# Patient Record
Sex: MALE | Race: OTHER | Employment: FULL TIME | URBAN - METROPOLITAN AREA
[De-identification: names, ages, dates, MRNs, and addresses within clinical notes are randomized per-mention and may not be internally consistent; named-entity substitution may affect disease eponyms.]

---

## 2017-05-26 PROBLEM — K21.9 GASTROESOPHAGEAL REFLUX DISEASE WITHOUT ESOPHAGITIS: Status: ACTIVE | Noted: 2017-05-26

## 2017-06-15 ENCOUNTER — ANESTHESIA EVENT (OUTPATIENT)
Dept: ENDOSCOPY | Age: 37
End: 2017-06-15
Payer: COMMERCIAL

## 2017-06-16 ENCOUNTER — HOSPITAL ENCOUNTER (OUTPATIENT)
Age: 37
Setting detail: OUTPATIENT SURGERY
Discharge: HOME | End: 2017-06-16
Attending: INTERNAL MEDICINE | Admitting: INTERNAL MEDICINE
Payer: COMMERCIAL

## 2017-06-16 ENCOUNTER — ANESTHESIA (OUTPATIENT)
Dept: ENDOSCOPY | Age: 37
End: 2017-06-16
Payer: COMMERCIAL

## 2017-06-16 VITALS
TEMPERATURE: 96 F | SYSTOLIC BLOOD PRESSURE: 115 MMHG | RESPIRATION RATE: 12 BRPM | DIASTOLIC BLOOD PRESSURE: 73 MMHG | HEART RATE: 68 BPM | OXYGEN SATURATION: 100 %

## 2017-06-16 PROCEDURE — 74011250636 HC RX REV CODE- 250/636

## 2017-06-16 PROCEDURE — 88342 IMHCHEM/IMCYTCHM 1ST ANTB: CPT | Performed by: INTERNAL MEDICINE

## 2017-06-16 PROCEDURE — 88305 TISSUE EXAM BY PATHOLOGIST: CPT | Performed by: INTERNAL MEDICINE

## 2017-06-16 PROCEDURE — 74011000250 HC RX REV CODE- 250

## 2017-06-16 PROCEDURE — 77030009426 HC FCPS BIOP ENDOSC BSC -B: Performed by: INTERNAL MEDICINE

## 2017-06-16 PROCEDURE — 74011250636 HC RX REV CODE- 250/636: Performed by: NURSE ANESTHETIST, CERTIFIED REGISTERED

## 2017-06-16 PROCEDURE — 76060000031 HC ANESTHESIA FIRST 0.5 HR: Performed by: INTERNAL MEDICINE

## 2017-06-16 PROCEDURE — 76040000019: Performed by: INTERNAL MEDICINE

## 2017-06-16 RX ORDER — LIDOCAINE HYDROCHLORIDE 10 MG/ML
0.1 INJECTION, SOLUTION EPIDURAL; INFILTRATION; INTRACAUDAL; PERINEURAL AS NEEDED
Status: DISCONTINUED | OUTPATIENT
Start: 2017-06-16 | End: 2017-06-16 | Stop reason: HOSPADM

## 2017-06-16 RX ORDER — SODIUM CHLORIDE, SODIUM LACTATE, POTASSIUM CHLORIDE, CALCIUM CHLORIDE 600; 310; 30; 20 MG/100ML; MG/100ML; MG/100ML; MG/100ML
50 INJECTION, SOLUTION INTRAVENOUS CONTINUOUS
Status: DISCONTINUED | OUTPATIENT
Start: 2017-06-17 | End: 2017-06-16 | Stop reason: HOSPADM

## 2017-06-16 RX ORDER — PROPOFOL 10 MG/ML
INJECTION, EMULSION INTRAVENOUS AS NEEDED
Status: DISCONTINUED | OUTPATIENT
Start: 2017-06-16 | End: 2017-06-16 | Stop reason: HOSPADM

## 2017-06-16 RX ORDER — SODIUM CHLORIDE 0.9 % (FLUSH) 0.9 %
5-10 SYRINGE (ML) INJECTION EVERY 8 HOURS
Status: DISCONTINUED | OUTPATIENT
Start: 2017-06-16 | End: 2017-06-16 | Stop reason: HOSPADM

## 2017-06-16 RX ORDER — SODIUM CHLORIDE 0.9 % (FLUSH) 0.9 %
5-10 SYRINGE (ML) INJECTION AS NEEDED
Status: DISCONTINUED | OUTPATIENT
Start: 2017-06-16 | End: 2017-06-16 | Stop reason: HOSPADM

## 2017-06-16 RX ORDER — LIDOCAINE HYDROCHLORIDE 20 MG/ML
INJECTION, SOLUTION EPIDURAL; INFILTRATION; INTRACAUDAL; PERINEURAL AS NEEDED
Status: DISCONTINUED | OUTPATIENT
Start: 2017-06-16 | End: 2017-06-16 | Stop reason: HOSPADM

## 2017-06-16 RX ORDER — DEXTROMETHORPHAN/PSEUDOEPHED 2.5-7.5/.8
1.2 DROPS ORAL
Status: DISCONTINUED | OUTPATIENT
Start: 2017-06-16 | End: 2017-06-16 | Stop reason: HOSPADM

## 2017-06-16 RX ORDER — PROPOFOL 10 MG/ML
INJECTION, EMULSION INTRAVENOUS
Status: DISCONTINUED | OUTPATIENT
Start: 2017-06-16 | End: 2017-06-16 | Stop reason: HOSPADM

## 2017-06-16 RX ORDER — FAMOTIDINE 20 MG/1
20 TABLET, FILM COATED ORAL ONCE
Status: DISCONTINUED | OUTPATIENT
Start: 2017-06-17 | End: 2017-06-16 | Stop reason: HOSPADM

## 2017-06-16 RX ADMIN — PROPOFOL 160 MCG/KG/MIN: 10 INJECTION, EMULSION INTRAVENOUS at 11:00

## 2017-06-16 RX ADMIN — PROPOFOL 100 MG: 10 INJECTION, EMULSION INTRAVENOUS at 11:08

## 2017-06-16 RX ADMIN — LIDOCAINE HYDROCHLORIDE 40 MG: 20 INJECTION, SOLUTION EPIDURAL; INFILTRATION; INTRACAUDAL; PERINEURAL at 11:08

## 2017-06-16 RX ADMIN — SODIUM CHLORIDE, SODIUM LACTATE, POTASSIUM CHLORIDE, AND CALCIUM CHLORIDE 50 ML/HR: 600; 310; 30; 20 INJECTION, SOLUTION INTRAVENOUS at 10:59

## 2017-06-16 NOTE — H&P
History and Physical    Cecil Prasad        1980  [id number]        [id number]     Pre-Procedure Diagnosis:  epigastric pain  r10.3    Chief Complaint:  No chief complaint on file. HPI: Patient with epigastric pain. Nausea but no vomiting. No other abdominal pain. No weight loss. No fevers or chills. No improvement with treatment. Past Medical History:   Diagnosis Date    Hypocholesterolemia     WPW (Scott-Parkinson-White syndrome)      Past Surgical History:   Procedure Laterality Date    HX CERVICAL DISKECTOMY      HX HEART VALVE SURGERY      HX WISDOM TEETH EXTRACTION       Family History   Problem Relation Age of Onset    No Known Problems Mother     Heart Disease Father     Hypertension Father     No Known Problems Sister     No Known Problems Brother      Social History     Social History    Marital status:      Spouse name: N/A    Number of children: N/A    Years of education: N/A     Social History Main Topics    Smoking status: Never Smoker    Smokeless tobacco: Never Used    Alcohol use Yes    Drug use: Not on file    Sexual activity: Not on file     Other Topics Concern    Not on file     Social History Narrative    No narrative on file       Allergies:  No Known Allergies  Medications:   No current facility-administered medications for this encounter. Vital Signs There were no vitals taken for this visit. Review of Systems  A comprehensive review of systems was negative except for that written in the History of Present Illness. Physical Exam:  General:  Alert, cooperative, no distress, appears stated age. Eyes:  Conjunctivae/corneas clear. PERRL, EOMs intact. Fundi benign           Mouth/Throat: Lips, mucosa, and tongue normal. Teeth and gums normal.   Neck: Supple, symmetrical, trachea midline, no adenopathy, thyroid: no enlargement/tenderness/nodules, no carotid bruit and no JVD. Lungs:   Clear to auscultation bilaterally.    Heart: Regular rate and rhythm, S1, S2 normal, no murmur, click, rub or gallop. Abdomen:   Soft, non-tender. Bowel sounds normal. No masses,  No organomegaly. Extremities: Extremities normal, atraumatic, no cyanosis or edema. Skin: Skin color, texture, turgor normal. No rashes or lesions             Laboratory Data:  No results found for this or any previous visit (from the past 24 hour(s)). Hospital Problems  Date Reviewed: 9/9/2016    None          Impression and Plan:  Epigastric pain.   Recommend EGD see prior H&P      Demetria Cobian MD  6/16/2017  10:37 AM

## 2017-06-16 NOTE — IP AVS SNAPSHOT
[address] [address] Toya Mccarty  
[phone] Patient: Tim Holland MRN: [id number] :1980 You are allergic to the following No active allergies Recent Documentation Smoking Status Never Smoker Emergency Contacts Name Discharge Info Relation Home Work Mobile Kimberlee Kinney DISCHARGE CAREGIVER [3] Spouse [3] [phone] About your hospitalization You were admitted on:  2017 You last received care in the:  [facility] PHASE 2 RECOVERY You were discharged on:  2017 Unit phone number:  [phone] Why you were hospitalized Your primary diagnosis was:  Not on File Providers Seen During Your Hospitalizations Provider Role Specialty Primary office phone Mike Sheth MD Attending Provider Gastroenterology [phone] Your Primary Care Physician (PCP) Primary Care Physician Office Phone Office Fax Hyland Curling [phone] Follow-up Information Follow up With Details Comments Contact Info Cassie Patrick MD   [address] [place] [address] [phone] Current Discharge Medication List  
  
CONTINUE these medications which have NOT CHANGED Dose & Instructions Dispensing Information Comments Morning Noon Evening Bedtime  
 multivitamin tablet Commonly known as:  ONE A DAY Your last dose was: Your next dose is:    
   
   
 Dose:  1 Tab Take 1 Tab by mouth daily. Refills:  0 Discharge Instructions Patient Discharge Instructions Tim Holland / [id number] : 1980 Admitted 2017 Discharged: 2017 Procedure Impression: 1. Esophagitis. Biopsies taken to evaluate for EoE and infectious sources. 2. Otherwise normal EGD. Biopsies taken to rule out H. Pylori and celiac disease. Recommendation: 1. Resume regular diet. 2. Will contact with biopsy results in 2 weeks 3. Follow up in office in 4 weeks. Recommended Diet: Regular Diet Recommended Activity: 1. Do not drink alcohol, drive or operate machinery for 12 hours 2. Call if any fever, abdominal pain or bleeding noted. Signed By: Ryder Ochoa MD   
 June 16, 2017 DISCHARGE SUMMARY from Nurse The following personal items are in your possession at time of discharge: 
 
Dental Appliances: None Visual Aid: None PATIENT INSTRUCTIONS: 
 
After general anesthesia or intravenous sedation, for 24 hours or while taking prescription Narcotics: · Limit your activities · Do not drive and operate hazardous machinery · Do not make important personal or business decisions · Do  not drink alcoholic beverages · If you have not urinated within 8 hours after discharge, please contact your surgeon on call. Report the following to your surgeon: 
· Excessive pain, swelling, redness or odor of or around the surgical area · Temperature over 100.5 · Nausea and vomiting lasting longer than 4 hours or if unable to take medications · Any signs of decreased circulation or nerve impairment to extremity: change in color, persistent  numbness, tingling, coldness or increase pain · Any questions What to do at Home: These are general instructions for a healthy lifestyle: No smoking/ No tobacco products/ Avoid exposure to second hand smoke Surgeon General's Warning:  Quitting smoking now greatly reduces serious risk to your health. Obesity, smoking, and sedentary lifestyle greatly increases your risk for illness A healthy diet, regular physical exercise & weight monitoring are important for maintaining a healthy lifestyle You may be retaining fluid if you have a history of heart failure or if you experience any of the following symptoms:  Weight gain of 3 pounds or more overnight or 5 pounds in a week, increased swelling in our hands or feet or shortness of breath while lying flat in bed. Please call your doctor as soon as you notice any of these symptoms; do not wait until your next office visit. Recognize signs and symptoms of STROKE: 
 
F-face looks uneven A-arms unable to move or move unevenly S-speech slurred or non-existent T-time-call 911 as soon as signs and symptoms begin-DO NOT go Back to bed or wait to see if you get better-TIME IS BRAIN. Warning Signs of HEART ATTACK Call 911 if you have these symptoms: 
? Chest discomfort. Most heart attacks involve discomfort in the center of the chest that lasts more than a few minutes, or that goes away and comes back. It can feel like uncomfortable pressure, squeezing, fullness, or pain. ? Discomfort in other areas of the upper body. Symptoms can include pain or discomfort in one or both arms, the back, neck, jaw, or stomach. ? Shortness of breath with or without chest discomfort. ? Other signs may include breaking out in a cold sweat, nausea, or lightheadedness. Don't wait more than five minutes to call [address]! Fast action can save your life. Calling 911 is almost always the fastest way to get lifesaving treatment. Emergency Medical Services staff can begin treatment when they arrive  up to an hour sooner than if someone gets to the hospital by car. The discharge information has been reviewed with the patient. The patient verbalized understanding. Discharge medications reviewed with the patient and appropriate educational materials and side effects teaching were provided. Patient armband removed and given to patient to take home. Patient was informed of the privacy risks if armband lost or stolen Discharge Orders None Introducing [facility]! [employer] introduces [email or web address] patient portal. Now you can access parts of your medical record, email your doctor's office, and request medication refills online. 1. In your internet browser, go to [email or web address] 2. Click on the First Time User? Click Here link in the Sign In box. You will see the New Member Sign Up page. 3. Enter your [email or web address] Access Code exactly as it appears below. You will not need to use this code after youve completed the sign-up process. If you do not sign up before the expiration date, you must request a new code. · [email or web address] Access Code: [email or web address] Expires: 9/13/2017  7:35 AM 
 
4. Enter the last four digits of your Social Security Number (xxxx) and Date of Birth (mm/dd/yyyy) as indicated and click Submit. You will be taken to the next sign-up page. 5. Create a [email or web address] ID. This will be your [email or web address] login ID and cannot be changed, so think of one that is secure and easy to remember. 6. Create a [email or web address] password. You can change your password at any time. 7. Enter your Password Reset Question and Answer. This can be used at a later time if you forget your password. 8. Enter your e-mail address. You will receive e-mail notification when new information is available in [address] 9. Click Sign Up. You can now view and download portions of your medical record. 10. Click the Download Summary menu link to download a portable copy of your medical information. If you have questions, please visit the Frequently Asked Questions section of the [email or web address] website. Remember, [email or web address] is NOT to be used for urgent needs. For medical emergencies, dial 911. Now available from your iPhone and Android! General Information Please provide this summary of care documentation to your next provider.  
  
  
    
    
 Patient Signature: ____________________________________________________________ Date:  ____________________________________________________________  
  
Leonel Breath Provider Signature:  ____________________________________________________________ Date:  ____________________________________________________________

## 2017-06-16 NOTE — ANESTHESIA PREPROCEDURE EVALUATION
Anesthetic History   No history of anesthetic complications            Review of Systems / Medical History  Patient summary reviewed and pertinent labs reviewed    Pulmonary  Within defined limits                 Neuro/Psych   Within defined limits           Cardiovascular            Dysrhythmias       Exercise tolerance: >4 METS     GI/Hepatic/Renal  Within defined limits              Endo/Other  Within defined limits           Other Findings   Comments:   Risk Factors for Postoperative nausea/vomiting:       History of postoperative nausea/vomiting? NO       Female? NO       Motion sickness? NO       Intended opioid administration for postoperative analgesia? NO      Smoking Abstinence  Current Smoker? NO  Elective Surgery? YES  Seen preoperatively by anesthesiologist or proxy prior to day of surgery? YES  Pt abstained from smoking 24 hours prior to anesthesia?  N/A           Physical Exam    Airway  Mallampati: II  TM Distance: 4 - 6 cm  Neck ROM: normal range of motion   Mouth opening: Normal     Cardiovascular    Rhythm: regular  Rate: normal         Dental  No notable dental hx       Pulmonary  Breath sounds clear to auscultation               Abdominal  GI exam deferred       Other Findings            Anesthetic Plan    ASA: 2  Anesthesia type: MAC            Anesthetic plan and risks discussed with: Patient

## 2017-06-16 NOTE — PROCEDURES
Endoscopy Procedure Note    Patient: Rafy Valencia MRN: [id number]  SSN: [id number]    YOB: 1980  Age: 44 y.o. Sex: male      Date/Time:  6/16/2017 11:19 AM    Esophagogastroduodenoscopy (EGD) Procedure Note    Procedure: Esophagogastroduodenoscopy with biopsy    IMPRESSION:   1. Esophagitis. Biopsies taken to evaluate for EoE and infectious sources. 2. Otherwise normal EGD. Biopsies taken to rule out H. Pylori and celiac disease. RECOMMENDATIONS:  1. Resume regular diet. 2. Will contact with biopsy results in 2 weeks. 3. Follow up in office in 4 weeks. Indication: Abdominal pain, epigastric  :  Jinny Harmon MD  Assistants: Endoscopy Technician-1: Nestor Dupont RN-1: Mukul Carlos RN    Referring Provider:   Bridget Rosado MD  History: The history and physical exam were reviewed and updated. Endoscope: Olympus GIF-190 diagnostic endoscope  Extent of Exam: third portion of the duodenum  ASA: ASA 3 - Patient with moderate systemic disease with functional limitations  Anethesia/Sedation:  MAC anesthesia    Description of the procedure: The procedure was discussed with the patient including risks, benefits, alternatives including risks of iv sedation, bleeding, perforation and aspiration. A safety timeout was performed. The patient was placed in the left lateral decubitus position. A bite block was placed. The patient was given incremental doses of intravenous sedation until moderate sedation was achieved. The patients vital signs were monitored at all times including heart rate/rhythm, blood pressure and oxygen saturation. The endoscope was then passed under direct visualization to the third portion of the duodenum. The endoscope was then slowly withdrawn while visualizing the mucosa. In the stomach a retroflexion was performed and gastric fundus and cardia visualized. The endoscope was then slowly withdrawn.   The patient was then transferred to recovery in stable condition. Findings:    Esophagus: There was edema and healing appearance to the mucosa with linear furrows. No stricture noted. No ulceration, mass. No evidence of Barretts. Biopsies taken. Stomach: The gastric mucosa was normal with no ulceration, mass, stricture. Four pinch biopsies taken. Duodenum: The duodenum mucosa was normal with no ulceration, mass, stricture and no evidence of villous atrophy. Four pinch biopsies taken for celiac disease evaluation. Therapies:  None    Specimens:   ID Type Source Tests Collected by Time Destination   1 : bx r/o celiac  Preservative Duodenum  Adria Walter MD 6/16/2017 1116 Pathology   2 : bx r/o H. Pylori  Preservative Gastric  Adria Walter MD 6/16/2017 1116 Pathology   3 : bx r/o esophagitis  Preservative Esophagus, Distal  Adria Walter MD 6/16/2017 1116 Pathology               Complications:   None; patient tolerated the procedure well.     EBL:Minimal    Discharge disposition:  Home in the company of  when able to ambulate    Adria Walter MD  June 16, 2017  11:19 AM

## 2017-06-16 NOTE — ANESTHESIA POSTPROCEDURE EVALUATION
Post-Anesthesia Evaluation and Assessment    Patient: Tim Holland MRN: [id number]  SSN: [id number]    YOB: 1980  Age: 44 y.o. Sex: male       Cardiovascular Function/Vital Signs  Visit Vitals    /73    Pulse 68    Temp 35.6 °C (96 °F)    Resp 12    SpO2 100%       Patient is status post MAC anesthesia for Procedure(s):  ESOPHAGOGASTRODUODENOSCOPY (EGD). Nausea/Vomiting: None    Postoperative hydration reviewed and adequate. Pain:  Pain Scale 1: Numeric (0 - 10) (06/16/17 1126)  Pain Intensity 1: 0 (06/16/17 1126)   Managed    Neurological Status: At baseline    Mental Status and Level of Consciousness: Alert and oriented     Pulmonary Status:   O2 Device: Room air (06/16/17 1127)   Adequate oxygenation and airway patent    Complications related to anesthesia: None    Post-anesthesia assessment completed.  No concerns    Signed By: Yaakov Duffy CRNA     June 16, 2017

## 2017-08-02 ENCOUNTER — OFFICE VISIT (OUTPATIENT)
Dept: FAMILY MEDICINE CLINIC | Age: 37
End: 2017-08-02

## 2017-08-02 VITALS
HEIGHT: 74 IN | SYSTOLIC BLOOD PRESSURE: 133 MMHG | DIASTOLIC BLOOD PRESSURE: 79 MMHG | WEIGHT: 199 LBS | RESPIRATION RATE: 20 BRPM | TEMPERATURE: 98.7 F | BODY MASS INDEX: 25.54 KG/M2 | OXYGEN SATURATION: 98 % | HEART RATE: 85 BPM

## 2017-08-02 DIAGNOSIS — Z23 ENCOUNTER FOR IMMUNIZATION: ICD-10-CM

## 2017-08-02 DIAGNOSIS — Z00.00 ROUTINE PHYSICAL EXAMINATION: Primary | ICD-10-CM

## 2017-08-02 RX ORDER — OMEPRAZOLE 40 MG/1
40 CAPSULE, DELAYED RELEASE ORAL DAILY
COMMUNITY
End: 2020-01-06

## 2017-08-02 NOTE — MR AVS SNAPSHOT
Visit Information Date & Time Provider Department Dept. Phone Encounter #  
 8/2/2017  2:00 PM April GriceInga [facility] [phone] [id number] Follow-up Instructions Return in about 1 year (around 8/2/2018), or as needed, for CPE. Upcoming Health Maintenance Date Due DTaP/Tdap/Td series (1 - Tdap) 1/9/2000 Allergies as of 8/2/2017  Review Complete On: 8/2/2017 By: Kamille Tristan MD  
 No Known Allergies Current Immunizations  Never Reviewed Name Date Tdap  Incomplete Not reviewed this visit You Were Diagnosed With   
  
 Codes Comments Routine physical examination    -  Primary ICD-10-CM: Z00.00 ICD-9-CM: V70.0 Encounter for immunization     ICD-10-CM: T61 ICD-9-CM: V03.89 Vitals BP Pulse Temp Resp Height(growth percentile) Weight(growth percentile) 133/79 85 98.7 °F (37.1 °C) (Oral) 20 6' 2\" (1.88 m) 199 lb (90.3 kg) SpO2 BMI Smoking Status 98% 25.55 kg/m2 Never Smoker BMI and BSA Data Body Mass Index Body Surface Area 25.55 kg/m 2 2.17 m 2 Preferred Pharmacy Pharmacy Name Phone [facility] [place] [address] [phone] Your Updated Medication List  
  
   
This list is accurate as of: 8/2/17  2:21 PM.  Always use your most recent med list.  
  
  
  
  
 multivitamin tablet Commonly known as:  ONE A DAY Take 1 Tab by mouth daily. PriLOSEC 40 mg capsule Generic drug:  omeprazole Take 40 mg by mouth daily. We Performed the Following TETANUS, DIPHTHERIA TOXOIDS AND ACELLULAR PERTUSSIS VACCINE (TDAP), IN INDIVIDS. >=7, IM [id number] CPT(R)] Follow-up Instructions Return in about 1 year (around 8/2/2018), or as needed, for CPE. To-Do List   
 08/02/2017 Lab:  CBC WITH AUTOMATED DIFF   
  
 08/02/2017 Lab:  HEMOGLOBIN A1C WITH EAG   
  
 08/02/2017   Lab:  LIPID PANEL   
  
 2017 Lab:  METABOLIC PANEL, COMPREHENSIVE Patient Instructions Vaccine Information Statement Tdap (Tetanus, Diphtheria, Pertussis) Vaccine: What You Need to Know Many Vaccine Information Statements are available in [place] and other languages. See www.immunize.org/vis. Hojas de Información Sobre Vacunas están disponibles en español y en muchos otros idiomas. Visite WorthScale.si 1. Why get vaccinated? Tetanus, diphtheria, and pertussis are very serious diseases. Tdap vaccine can protect us from these diseases. And, Tdap vaccine given to pregnant women can protect  babies against pertussis. TETANUS (Lockjaw) is rare in the [facility] today. It causes painful muscle tightening and stiffness, usually all over the body. ? It can lead to tightening of muscles in the head and neck so you cant open your mouth, swallow, or sometimes even breathe. Tetanus kills about 1 out of 10 people who are infected even after receiving the best medical care. DIPHTHERIA is also rare in the [facility] today. It can cause a thick coating to form in the back of the throat. ? It can lead to breathing problems, heart failure, paralysis, and death. PERTUSSIS (Whooping Cough) causes severe coughing spells, which can cause difficulty breathing, vomiting, and disturbed sleep. ? It can also lead to weight loss, incontinence, and rib fractures. Up to 2 in 100 adolescents and 5 in 100 adults with pertussis are hospitalized or have complications, which could include pneumonia or death. These diseases are caused by bacteria. Diphtheria and pertussis are spread from person to person through secretions from coughing or sneezing. Tetanus enters the body through cuts, scratches, or wounds.  
 
Before vaccines, as many as 200,000 cases of diphtheria, 200,000 cases of pertussis, and hundreds of cases of tetanus, were reported in the [facility] each year. Since vaccination began, reports of cases for tetanus and diphtheria have dropped by about 99% and for pertussis by about 80%. 2. Tdap vaccine Tdap vaccine can protect adolescents and adults from tetanus, diphtheria, and pertussis. One dose of Tdap is routinely given at age 6 or 15. People who did not get Tdap at that age should get it as soon as possible. Tdap is especially important for health care professionals and anyone having close contact with a baby younger than 12 months. Pregnant women should get a dose of Tdap during every pregnancy, to protect the  from pertussis. Infants are most at risk for severe, life-threatening complications from pertussis. Another vaccine, called Td, protects against tetanus and diphtheria, but not pertussis. A Td booster should be given every 10 years. Tdap may be given as one of these boosters if you have never gotten Tdap before. Tdap may also be given after a severe cut or burn to prevent tetanus infection. Your doctor or the person giving you the vaccine can give you more information. Tdap may safely be given at the same time as other vaccines. 3. Some people should not get this vaccine  A person who has ever had a life-threatening allergic reaction after a previous dose of any diphtheria, tetanus or pertussis containing vaccine, OR has a severe allergy to any part of this vaccine, should not get Tdap vaccine. Tell the person giving the vaccine about any severe allergies.  Anyone who had coma or long repeated seizures within 7 days after a childhood dose of DTP or DTaP, or a previous dose of Tdap, should not get Tdap, unless a cause other than the vaccine was found. They can still get Td.  
 
 Talk to your doctor if you: 
- have seizures or another nervous system problem, 
- had severe pain or swelling after any vaccine containing diphtheria, tetanus or pertussis,  
 - ever had a condition called Guillain Barré Syndrome (GBS), 
- arent feeling well on the day the shot is scheduled. 4. Risks With any medicine, including vaccines, there is a chance of side effects. These are usually mild and go away on their own. Serious reactions are also possible but are rare. Most people who get Tdap vaccine do not have any problems with it. Mild Problems following Tdap 
(Did not interfere with activities)  Pain where the shot was given (about 3 in 4 adolescents or 2 in 3 adults)  Redness or swelling where the shot was given (about 1 person in 5)  Mild fever of at least 100.4°F (up to about 1 in 25 adolescents or 1 in 100 adults)  Headache (about 3 or 4 people in 10)  Tiredness (about 1 person in 3 or 4)  Nausea, vomiting, diarrhea, stomach ache (up to 1 in 4 adolescents or 1 in 10 adults)  Chills,  sore joints (about 1 person in 10)  Body aches (about 1 person in 3 or 4)  Rash, swollen glands (uncommon) Moderate Problems following Tdap (Interfered with activities, but did not require medical attention)  Pain where the shot was given (up to 1 in 5 or 6)  Redness or swelling where the shot was given (up to about 1 in 16 adolescents or 1 in 12 adults)  Fever over 102°F (about 1 in 100 adolescents or 1 in 250 adults)  Headache (about 1 in 7 adolescents or 1 in 10 adults)  Nausea, vomiting, diarrhea, stomach ache (up to 1 or 3 people in 100)  Swelling of the entire arm where the shot was given (up to about 1 in 500). Severe Problems following Tdap 
(Unable to perform usual activities; required medical attention)  Swelling, severe pain, bleeding, and redness in the arm where the shot was given (rare). Problems that could happen after any vaccine:  People sometimes faint after a medical procedure, including vaccination.  Sitting or lying down for about 15 minutes can help prevent fainting, and injuries caused by a fall. Tell your doctor if you feel dizzy, or have vision changes or ringing in the ears.  Some people get severe pain in the shoulder and have difficulty moving the arm where a shot was given. This happens very rarely.  Any medication can cause a severe allergic reaction. Such reactions from a vaccine are very rare, estimated at fewer than 1 in a million doses, and would happen within a few minutes to a few hours after the vaccination. As with any medicine, there is a very remote chance of a vaccine causing a serious injury or death. The safety of vaccines is always being monitored. For more information, visit: www.cdc.gov/vaccinesafety/ 
 
 
The [facility] Vaccine Injury Compensation Program (VICP) is a federal program that was created to compensate people who may have been injured by certain vaccines.  
 
Persons who believe they may have been injured by a vaccine can learn about the program and about filing a claim by calling 0-452.971.2498 or visiting the 1900  website at www.[facility].gov/vaccinecompensation. There is a time limit to file a claim for compensation. 7. How can I learn more?  Ask your doctor. He or she can give you the vaccine package insert or suggest other sources of information.  Call your local or state health department.  Contact the Centers for Disease Control and Prevention (CDC): 
- Call 6-177.879.2291 (1-264-ATP-INFO) or 
- Visit CDCs website at www.cdc.gov/vaccines Vaccine Information Statement Tdap Vaccine 
(2/24/2015) 42 DESTINEE You Paci 428XZ-41 Department of Health and [employer] Centers for Disease Control and Prevention Office Use Only [facility]! Dear Leigh Ann Vasquez: 
Thank you for requesting a Fonality account. Our records indicate that you already have an active Fonality account. You can access your account anytime at [email or web address] Did you know that you can access your hospital and ER discharge instructions at any time in Fonality? You can also review all of your test results from your hospital stay or ER visit. Additional Information If you have questions, please visit the Frequently Asked Questions section of the Fonality website at [email or web address] Remember, Fonality is NOT to be used for urgent needs. For medical emergencies, dial 911. Now available from your iPhone and Android! Please provide this summary of care documentation to your next provider. Your primary care clinician is listed as Gloria Malone. If you have any questions after today's visit, please call [phone].

## 2017-08-02 NOTE — PROGRESS NOTES
ANNUAL PHYSICAL EXAMINATION    History of Present Illness  Fuad Trivedi is a 44 y.o. male who presents today for management of    Chief Complaint   Patient presents with    Physical       Patient was started on omeprazole by GI for GERD. Patient rides his bike about 10-60 miles per week. He generally follows a healthy diet. He is sexually active with his wife. Past Medical History  Past Medical History:   Diagnosis Date    Hypocholesterolemia     WPW (Scott-Parkinson-White syndrome)         Surgical History  Past Surgical History:   Procedure Laterality Date    HX CERVICAL DISKECTOMY      HX HEART VALVE SURGERY      HX WISDOM TEETH EXTRACTION          Current Medications  Current Outpatient Prescriptions   Medication Sig    omeprazole (PRILOSEC) 40 mg capsule Take 40 mg by mouth daily.  multivitamin (ONE A DAY) tablet Take 1 Tab by mouth daily. No current facility-administered medications for this visit. Allergies/Drug Reactions  No Known Allergies     Family History  Family History   Problem Relation Age of Onset    No Known Problems Mother     Heart Disease Father     Hypertension Father     No Known Problems Sister     No Known Problems Brother         Social History  Social History     Social History    Marital status:      Spouse name: N/A    Number of children: N/A    Years of education: N/A     Occupational History    Not on file.      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     Review of Systems  General ROS: negative for - chills, fatigue, fever, sleep disturbance, weight gain or weight loss  Psychological ROS: negative for - anxiety, concentration difficulties, depression or mood swings  Ophthalmic ROS: positive for - uses glasses  negative for - blurry vision, decreased vision, double vision, dry eyes, excessive tearing or eye pain  ENT ROS: negative  Allergy and Immunology ROS: negative for - nasal congestion or seasonal allergies  Hematological and Lymphatic ROS: negative for - bleeding problems, blood clots or blood transfusions  Endocrine ROS: negative for - hot flashes, palpitations or temperature intolerance  Breast ROS: negative  Respiratory ROS: no cough, shortness of breath, or wheezing  Cardiovascular ROS: no chest pain or dyspnea on exertion  Gastrointestinal ROS: no abdominal pain, change in bowel habits, or black or bloody stools  Genito-Urinary ROS: no dysuria, trouble voiding, or hematuria  Musculoskeletal ROS: negative for - joint pain, muscle pain or muscular weakness  Neurological ROS: negative for - dizziness, headaches, numbness/tingling or seizures  Dermatological ROS: negative     Hearing Screening    125Hz 250Hz 500Hz 1000Hz 2000Hz 3000Hz 4000Hz 6000Hz 8000Hz   Right ear:   Pass Pass Pass  Pass     Left ear:   Pass Pass Pass  Pass        Visual Acuity Screening    Right eye Left eye Both eyes   Without correction:      With correction: 20/13 20/10 20/10       Physical Exam  Vital signs:   Vitals:    08/02/17 1404   BP: 133/79   Pulse: 85   Resp: 20   Temp: 98.7 °F (37.1 °C)   TempSrc: Oral   SpO2: 98%   Weight: 199 lb (90.3 kg)   Height: 6' 2\" (1.88 m)       General: alert, oriented, not in distress  Head: scalp normal, atraumatic  Eyes: pupils are equal and reactive, full and intact EOM's  Ears: patent ear canal, intact tympanic membrane  Nose: normal turbinates, no congestion or discharge  Lips/Mouth: moist lips and buccal mucosa, non-enlarged tonsils, pink throat  Neck: supple, no JVD, no lymphadenopathy, non-palpable thyroid  Chest/Lungs: clear breath sounds, no wheezing or crackles  Heart: normal rate, regular rhythm, no murmur  Abdomen: soft, non-distended, non-tender, normal bowel sounds, no organomegaly, no masses  Extremities: no focal deformities, no edema  Skin: no active skin lesions      Assessment/Plan:        ICD-10-CM ICD-9-CM    1. Routine physical examination Z00.00 V70.0 CBC WITH AUTOMATED DIFF      HEMOGLOBIN A1C WITH EAG      LIPID PANEL      METABOLIC PANEL, COMPREHENSIVE   2. Encounter for immunization Z23 V03.89 TETANUS, DIPHTHERIA TOXOIDS AND ACELLULAR PERTUSSIS VACCINE (TDAP), IN INDIVIDS. >=7, IM         Health Maintenance  Colon cancer: due at age 48  Dyslipidemia: will check FLP and CMP  Diabetes mellitus: will check FBG  Influenza vaccine: due in the fall  Pneumococcal vaccine: not indicated  Tdap: not sure  Herpes Zoster vaccine: due at age 61  Hep B vaccine: not indicated    Weight:  Body mass index is Estimated body mass index is 25.55 kg/(m^2) as calculated from the following:    Height as of this encounter: 6' 2\" (1.88 m). Weight as of this encounter: 199 lb (90.3 kg). .       Follow-up Disposition:  Return in about 1 year (around 8/2/2018), or as needed, for CPE. I have discussed the diagnosis with the patient and the intended plan as seen in the above orders. The patient has received an after-visit summary and questions were answered concerning future plans. I have discussed medication side effects and warnings with the patient as well. I have reviewed the plan of care with the patient, accepted their input and they are in agreement with the treatment goals.        Shasha Villanueva MD  August 2, 2017

## 2017-08-02 NOTE — PATIENT INSTRUCTIONS
Vaccine Information Statement     Tdap (Tetanus, Diphtheria, Pertussis) Vaccine: What You Need to Know    Many Vaccine Information Statements are available in Syriac and other languages. See www.immunize.org/vis. Hojas de Información Sobre Vacunas están disponibles en español y en muchos otros idiomas. Visite [email or web address].si    1. Why get vaccinated? Tetanus, diphtheria, and pertussis are very serious diseases. Tdap vaccine can protect us from these diseases. And, Tdap vaccine given to pregnant women can protect  babies against pertussis. TETANUS (Lockjaw) is rare in the [facility] today. It causes painful muscle tightening and stiffness, usually all over the body.  It can lead to tightening of muscles in the head and neck so you cant open your mouth, swallow, or sometimes even breathe. Tetanus kills about 1 out of 10 people who are infected even after receiving the best medical care. DIPHTHERIA is also rare in the [facility] today. It can cause a thick coating to form in the back of the throat.  It can lead to breathing problems, heart failure, paralysis, and death. PERTUSSIS (Whooping Cough) causes severe coughing spells, which can cause difficulty breathing, vomiting, and disturbed sleep.  It can also lead to weight loss, incontinence, and rib fractures. Up to 2 in 100 adolescents and 5 in 100 adults with pertussis are hospitalized or have complications, which could include pneumonia or death. These diseases are caused by bacteria. Diphtheria and pertussis are spread from person to person through secretions from coughing or sneezing. Tetanus enters the body through cuts, scratches, or wounds. Before vaccines, as many as 200,000 cases of diphtheria, 200,000 cases of pertussis, and hundreds of cases of tetanus, were reported in the [place] each year.  Since vaccination began, reports of cases for tetanus and diphtheria have dropped by about 99% and for pertussis by about 80%. 2. Tdap vaccine    Tdap vaccine can protect adolescents and adults from tetanus, diphtheria, and pertussis. One dose of Tdap is routinely given at age 6 or 15. People who did not get Tdap at that age should get it as soon as possible. Tdap is especially important for health care professionals and anyone having close contact with a baby younger than 12 months. Pregnant women should get a dose of Tdap during every pregnancy, to protect the  from pertussis. Infants are most at risk for severe, life-threatening complications from pertussis. Another vaccine, called Td, protects against tetanus and diphtheria, but not pertussis. A Td booster should be given every 10 years. Tdap may be given as one of these boosters if you have never gotten Tdap before. Tdap may also be given after a severe cut or burn to prevent tetanus infection. Your doctor or the person giving you the vaccine can give you more information. Tdap may safely be given at the same time as other vaccines. 3. Some people should not get this vaccine     A person who has ever had a life-threatening allergic reaction after a previous dose of any diphtheria, tetanus or pertussis containing vaccine, OR has a severe allergy to any part of this vaccine, should not get Tdap vaccine. Tell the person giving the vaccine about any severe allergies.  Anyone who had coma or long repeated seizures within 7 days after a childhood dose of DTP or DTaP, or a previous dose of Tdap, should not get Tdap, unless a cause other than the vaccine was found. They can still get Td.  Talk to your doctor if you:  - have seizures or another nervous system problem,  - had severe pain or swelling after any vaccine containing diphtheria, tetanus or pertussis,   - ever had a condition called Guillain Barré Syndrome (GBS),  - arent feeling well on the day the shot is scheduled.     4. Risks    With any medicine, including vaccines, there is a chance of side effects. These are usually mild and go away on their own. Serious reactions are also possible but are rare. Most people who get Tdap vaccine do not have any problems with it. Mild Problems following Tdap  (Did not interfere with activities)   Pain where the shot was given (about 3 in 4 adolescents or 2 in 3 adults)   Redness or swelling where the shot was given (about 1 person in 5)   Mild fever of at least 100.4°F (up to about 1 in 25 adolescents or 1 in 100 adults)   Headache (about 3 or 4 people in 10)   Tiredness (about 1 person in 3 or 4)   Nausea, vomiting, diarrhea, stomach ache (up to 1 in 4 adolescents or 1 in 10 adults)   Chills,  sore joints (about 1 person in 10)   Body aches (about 1 person in 3 or 4)    Rash, swollen glands (uncommon)    Moderate Problems following Tdap  (Interfered with activities, but did not require medical attention)   Pain where the shot was given (up to 1 in 5 or 6)    Redness or swelling where the shot was given (up to about 1 in 16 adolescents or 1 in 12 adults)   Fever over 102°F (about 1 in 100 adolescents or 1 in 250 adults)   Headache (about 1 in 7 adolescents or 1 in 10 adults)   Nausea, vomiting, diarrhea, stomach ache (up to 1 or 3 people in 100)   Swelling of the entire arm where the shot was given (up to about 1 in 500). Severe Problems following Tdap  (Unable to perform usual activities; required medical attention)   Swelling, severe pain, bleeding, and redness in the arm where the shot was given (rare). Problems that could happen after any vaccine:     People sometimes faint after a medical procedure, including vaccination. Sitting or lying down for about 15 minutes can help prevent fainting, and injuries caused by a fall. Tell your doctor if you feel dizzy, or have vision changes or ringing in the ears.      Some people get severe pain in the shoulder and have difficulty moving the arm where a shot was given. This happens very rarely.  Any medication can cause a severe allergic reaction. Such reactions from a vaccine are very rare, estimated at fewer than 1 in a million doses, and would happen within a few minutes to a few hours after the vaccination. As with any medicine, there is a very remote chance of a vaccine causing a serious injury or death. The safety of vaccines is always being monitored. For more information, visit: www.cdc.gov/vaccinesafety/    5. What if there is a serious problem? What should I look for?  Look for anything that concerns you, such as signs of a severe allergic reaction, very high fever, or unusual behavior.  Signs of a severe allergic reaction can include hives, swelling of the face and throat, difficulty breathing, a fast heartbeat, dizziness, and weakness. These would usually start a few minutes to a few hours after the vaccination. What should I do?  If you think it is a severe allergic reaction or other emergency that cant wait, call 9-1-1 or get the person to the nearest hospital. Otherwise, call your doctor.  Afterward, the reaction should be reported to the Vaccine Adverse Event Reporting System (VAERS). Your doctor might file this report, or you can do it yourself through the VAERS web site at www.vaers. [facility].gov, or by calling 0-651.218.1219. VAERS does not give medical advice. 6. The National Vaccine Injury Compensation Program    The [facility] Vaccine Injury Compensation Program (VICP) is a federal program that was created to compensate people who may have been injured by certain vaccines. Persons who believe they may have been injured by a vaccine can learn about the program and about filing a claim by calling 1-781.638.8883 or visiting the [employer] website at www.[facility].gov/vaccinecompensation. There is a time limit to file a claim for compensation. 7. How can I learn more?  Ask your doctor.  He or she can give you the vaccine package insert or suggest other sources of information.  Call your local or state health department.  Contact the Centers for Disease Control and Prevention (CDC):  - Call 6-104.132.8884 (1-800-CDC-INFO) or  - Visit CDCs website at www.cdc.gov/vaccines      Vaccine Information Statement   Tdap Vaccine  (2/24/2015)  42 DESTINEE Means [id number]    Department of Health and Human Services  Centers for Disease Control and Prevention    Office Use Only

## 2017-08-02 NOTE — PROGRESS NOTES
1. Have you been to the ER, urgent care clinic since your last visit? Hospitalized since your last visit? No    2. Have you seen or consulted any other health care providers outside of the [address] [place] [address] since your last visit? Include any pap smears or colon screening.  No

## 2017-08-10 ENCOUNTER — HOSPITAL ENCOUNTER (OUTPATIENT)
Dept: LAB | Age: 37
Discharge: HOME | End: 2017-08-10
Payer: COMMERCIAL

## 2017-08-10 LAB
ALBUMIN SERPL BCP-MCNC: 4.2 G/DL (ref 3.4–5)
ALBUMIN/GLOB SERPL: 1.4 {RATIO} (ref 0.8–1.7)
ALP SERPL-CCNC: 55 U/L (ref 45–117)
ALT SERPL-CCNC: 28 U/L (ref 16–61)
ANION GAP BLD CALC-SCNC: 4 MMOL/L (ref 3–18)
AST SERPL W P-5'-P-CCNC: 14 U/L (ref 15–37)
BASOPHILS # BLD AUTO: 0 K/UL (ref 0–0.06)
BASOPHILS # BLD: 1 % (ref 0–2)
BILIRUB SERPL-MCNC: 0.6 MG/DL (ref 0.2–1)
BUN SERPL-MCNC: 14 MG/DL (ref 7–18)
BUN/CREAT SERPL: 13 (ref 12–20)
CALCIUM SERPL-MCNC: 9.5 MG/DL (ref 8.5–10.1)
CHLORIDE SERPL-SCNC: 105 MMOL/L (ref 100–108)
CHOLEST SERPL-MCNC: 139 MG/DL
CO2 SERPL-SCNC: 32 MMOL/L (ref 21–32)
CREAT SERPL-MCNC: 1.05 MG/DL (ref 0.6–1.3)
DIFFERENTIAL METHOD BLD: ABNORMAL
EOSINOPHIL # BLD: 0.4 K/UL (ref 0–0.4)
EOSINOPHIL NFR BLD: 7 % (ref 0–5)
ERYTHROCYTE [DISTWIDTH] IN BLOOD BY AUTOMATED COUNT: 12.5 % (ref 11.6–14.5)
EST. AVERAGE GLUCOSE BLD GHB EST-MCNC: 123 MG/DL
GLOBULIN SER CALC-MCNC: 3 G/DL (ref 2–4)
GLUCOSE SERPL-MCNC: 97 MG/DL (ref 74–99)
HBA1C MFR BLD: 5.9 % (ref 4.2–5.6)
HCT VFR BLD AUTO: 45 % (ref 36–48)
HDLC SERPL-MCNC: 53 MG/DL (ref 40–60)
HDLC SERPL: 2.6 {RATIO} (ref 0–5)
HGB BLD-MCNC: 14.8 G/DL (ref 13–16)
LDLC SERPL CALC-MCNC: 73.8 MG/DL (ref 0–100)
LIPID PROFILE,FLP: NORMAL
LYMPHOCYTES # BLD AUTO: 37 % (ref 21–52)
LYMPHOCYTES # BLD: 2.1 K/UL (ref 0.9–3.6)
MCH RBC QN AUTO: 29.3 PG (ref 24–34)
MCHC RBC AUTO-ENTMCNC: 32.9 G/DL (ref 31–37)
MCV RBC AUTO: 89.1 FL (ref 74–97)
MONOCYTES # BLD: 0.5 K/UL (ref 0.05–1.2)
MONOCYTES NFR BLD AUTO: 8 % (ref 3–10)
NEUTS SEG # BLD: 2.7 K/UL (ref 1.8–8)
NEUTS SEG NFR BLD AUTO: 47 % (ref 40–73)
PLATELET # BLD AUTO: 235 K/UL (ref 135–420)
PMV BLD AUTO: 9.9 FL (ref 9.2–11.8)
POTASSIUM SERPL-SCNC: 5.2 MMOL/L (ref 3.5–5.5)
PROT SERPL-MCNC: 7.2 G/DL (ref 6.4–8.2)
RBC # BLD AUTO: 5.05 M/UL (ref 4.7–5.5)
SODIUM SERPL-SCNC: 141 MMOL/L (ref 136–145)
TRIGL SERPL-MCNC: 61 MG/DL (ref ?–150)
VLDLC SERPL CALC-MCNC: 12.2 MG/DL
WBC # BLD AUTO: 5.6 K/UL (ref 4.6–13.2)

## 2017-08-10 PROCEDURE — 85025 COMPLETE CBC W/AUTO DIFF WBC: CPT | Performed by: INTERNAL MEDICINE

## 2017-08-10 PROCEDURE — 36415 COLL VENOUS BLD VENIPUNCTURE: CPT | Performed by: INTERNAL MEDICINE

## 2017-08-10 PROCEDURE — 80053 COMPREHEN METABOLIC PANEL: CPT | Performed by: INTERNAL MEDICINE

## 2017-08-10 PROCEDURE — 83036 HEMOGLOBIN GLYCOSYLATED A1C: CPT | Performed by: INTERNAL MEDICINE

## 2017-08-10 PROCEDURE — 80061 LIPID PANEL: CPT | Performed by: INTERNAL MEDICINE

## 2018-03-23 ENCOUNTER — OFFICE VISIT (OUTPATIENT)
Dept: FAMILY MEDICINE CLINIC | Age: 38
End: 2018-03-23

## 2018-03-23 ENCOUNTER — HOSPITAL ENCOUNTER (OUTPATIENT)
Dept: GENERAL RADIOLOGY | Age: 38
Discharge: HOME | End: 2018-03-23
Payer: COMMERCIAL

## 2018-03-23 VITALS
DIASTOLIC BLOOD PRESSURE: 76 MMHG | BODY MASS INDEX: 26.23 KG/M2 | OXYGEN SATURATION: 96 % | HEIGHT: 74 IN | TEMPERATURE: 98.3 F | RESPIRATION RATE: 20 BRPM | WEIGHT: 204.4 LBS | HEART RATE: 102 BPM | SYSTOLIC BLOOD PRESSURE: 125 MMHG

## 2018-03-23 DIAGNOSIS — M19.079 INFLAMMATION OF METATARSOPHALANGEAL JOINT: Primary | ICD-10-CM

## 2018-03-23 DIAGNOSIS — M19.079 INFLAMMATION OF METATARSOPHALANGEAL JOINT: ICD-10-CM

## 2018-03-23 DIAGNOSIS — M25.511 CHRONIC RIGHT SHOULDER PAIN: ICD-10-CM

## 2018-03-23 DIAGNOSIS — G89.29 CHRONIC RIGHT SHOULDER PAIN: ICD-10-CM

## 2018-03-23 PROCEDURE — 73030 X-RAY EXAM OF SHOULDER: CPT

## 2018-03-23 PROCEDURE — 73630 X-RAY EXAM OF FOOT: CPT

## 2018-03-23 NOTE — PROGRESS NOTES
1. Have you been to the ER, urgent care clinic since your last visit? Hospitalized since your last visit? No    2. Have you seen or consulted any other health care providers outside of the [address] [place] [address] since your last visit? Include any pap smears or colon screening.  No

## 2018-03-23 NOTE — MR AVS SNAPSHOT
Kinga Nava 
 
 
 [id number] [place] [id number] [address] [address] 
[phone] Patient: Ryan Prado MRN: [id number] :1980 Visit Information Date & Time Provider Department Dept. Phone Encounter #  
 3/23/2018  2:15 PM Anmol Quinteros, [facility] [phone] [id number] Upcoming Health Maintenance Date Due DTaP/Tdap/Td series (2 - Td) 2027 Allergies as of 3/23/2018  Review Complete On: 3/23/2018 By: Anmol Quinteros MD  
 No Known Allergies Current Immunizations  Never Reviewed Name Date Tdap 2017 Not reviewed this visit You Were Diagnosed With   
  
 Codes Comments Inflammation of metatarsophalangeal joint    -  Primary ICD-10-CM: M19.90 ICD-9-CM: 716.97 Chronic right shoulder pain     ICD-10-CM: M25.511, G89.29 ICD-9-CM: 719.41, 338.29 Vitals BP Pulse Temp Resp Height(growth percentile) Weight(growth percentile) 125/76 (!) 102 98.3 °F (36.8 °C) (Oral) 20 6' 2\" (1.88 m) 204 lb 6.4 oz (92.7 kg) SpO2 BMI Smoking Status 96% 26.24 kg/m2 Never Smoker BMI and BSA Data Body Mass Index Body Surface Area  
 26.24 kg/m 2 2.2 m 2 Preferred Pharmacy Pharmacy Name Phone [facility] [place] [address] [phone] Your Updated Medication List  
  
   
This list is accurate as of 3/23/18  2:59 PM.  Always use your most recent med list.  
  
  
  
  
 multivitamin tablet Commonly known as:  ONE A DAY Take 1 Tab by mouth daily. PriLOSEC 40 mg capsule Generic drug:  omeprazole Take 40 mg by mouth daily. To-Do List   
 2018 Imaging:  XR FOOT RT MIN 3 V   
  
 2018 Imaging:  XR SHOULDER RT AP/LAT MIN 2 V Patient Instructions Shoulder Arthritis: Exercises Your Care Instructions Here are some examples of typical rehabilitation exercises for your condition. Start each exercise slowly. Ease off the exercise if you start to have pain. Your doctor or physical therapist will tell you when you can start these exercises and which ones will work best for you. How to do the exercises Shoulder flexion (lying down) To make a wand for this exercise, use a piece of PVC pipe or a broom handle with the broom removed. Make the wand about a foot wider than your shoulders. 1. Lie on your back, holding a wand with both hands. Your palms should face down as you hold the wand. 2. Keeping your elbows straight, slowly raise your arms over your head. Raise them until you feel a stretch in your shoulders, upper back, and chest. 
3. Hold for 15 to 30 seconds. 4. Repeat 2 to 4 times. Shoulder rotation (lying down) To make a wand for this exercise, use a piece of PVC pipe or a broom handle with the broom removed. Make the wand about a foot wider than your shoulders. 1. Lie on your back. Hold a wand with both hands with your elbows bent and palms up. 2. Keep your elbows close to your body, and move the wand across your body toward the sore arm. 3. Hold for 8 to 12 seconds. 4. Repeat 2 to 4 times. Shoulder internal rotation with towel 1. Hold a towel above and behind your head with the arm that is not sore. 2. With your sore arm, reach behind your back and grasp the towel. 3. With the arm above your head, pull the towel upward. Do this until you feel a stretch on the front and outside of your sore shoulder. 4. Hold 15 to 30 seconds. 5. Repeat 2 to 4 times. Shoulder blade squeeze 1. Stand with your arms at your sides, and squeeze your shoulder blades together. Do not raise your shoulders up as you squeeze. 2. Hold 6 seconds. 3. Repeat 8 to 12 times. Resisted rows For this exercise, you will need elastic exercise material, such as surgical tubing or Thera-Band. 1. Put the band around a solid object at about waist level.  (A bedpost will work well.) Each hand should hold an end of the band. 2. With your elbows at your sides and bent to 90 degrees, pull the band back. Your shoulder blades should move toward each other. Return to the starting position. 3. Repeat 8 to 12 times. External rotator strengthening exercise 1. Start by tying a piece of elastic exercise material to a doorknob. You can use surgical tubing or Thera-Band. (You may also hold one end of the band in each hand.) 2. Stand or sit with your shoulder relaxed and your elbow bent 90 degrees. Your upper arm should rest comfortably against your side. Squeeze a rolled towel between your elbow and your body for comfort. This will help keep your arm at your side. 3. Hold one end of the elastic band with the hand of the painful arm. 4. Start with your forearm across your belly. Slowly rotate the forearm out away from your body. Keep your elbow and upper arm tucked against the towel roll or the side of your body until you begin to feel tightness in your shoulder. Slowly move your arm back to where you started. 5. Repeat 8 to 12 times. Internal rotator strengthening exercise 1. Start by tying a piece of elastic exercise material to a doorknob. You can use surgical tubing or Thera-Band. 2. Stand or sit with your shoulder relaxed and your elbow bent 90 degrees. Your upper arm should rest comfortably against your side. Squeeze a rolled towel between your elbow and your body for comfort. This will help keep your arm at your side. 3. Hold one end of the elastic band in the hand of the painful arm. 4. Slowly rotate your forearm toward your body until it touches your belly. Slowly move it back to where you started. 5. Keep your elbow and upper arm firmly tucked against the towel roll or at your side. 6. Repeat 8 to 12 times. Pendulum swing If you have pain in your back, do not do this exercise. 1. Hold on to a table or the back of a chair with your good arm.  Then bend forward a little and let your sore arm hang straight down. This exercise does not use the arm muscles. Rather, use your legs and your hips to create movement that makes your arm swing freely. 2. Use the movement from your hips and legs to guide the slightly swinging arm back and forth like a pendulum (or elephant trunk). Then guide it in circles that start small (about the size of a dinner plate). Make the circles a bit larger each day, as your pain allows. 3. Do this exercise for 5 minutes, 5 to 7 times each day. 4. As you have less pain, try bending over a little farther to do this exercise. This will increase the amount of movement at your shoulder. Follow-up care is a key part of your treatment and safety. Be sure to make and go to all appointments, and call your doctor if you are having problems. It's also a good idea to know your test results and keep a list of the medicines you take. Where can you learn more? Go to http://blanca-jef.info/. Enter H562 in the search box to learn more about \"Shoulder Arthritis: Exercises. \" Current as of: March 21, 2017 Content Version: 11.4 © 8648-5302 Med[employer]. Care instructions adapted under license by [employer] (which disclaims liability or warranty for this information). If you have questions about a medical condition or this instruction, always ask your healthcare professional. [address] any warranty or liability for your use of this information. Introducing [facility]! Dear Lakeisha Tena: 
Thank you for requesting a [employer] are family too account. Our records indicate that you already have an active [employer] account. You can access your account anytime at [email or web address] Did you know that you can access your hospital and ER discharge instructions at any time in [employer] family [employer]? You can also review all of your test results from your hospital stay or ER visit. Additional Information If you have questions, please visit the Frequently Asked Questions section of the [employer] website at [email or web address] Remember, [employer] is NOT to be used for urgent needs. For medical emergencies, dial 911. Now available from your iPhone and Android! Please provide this summary of care documentation to your next provider. Your primary care clinician is listed as Magui Mercado. If you have any questions after today's visit, please call [phone].

## 2018-03-23 NOTE — PATIENT INSTRUCTIONS
Shoulder Arthritis: Exercises  Your Care Instructions  Here are some examples of typical rehabilitation exercises for your condition. Start each exercise slowly. Ease off the exercise if you start to have pain. Your doctor or physical therapist will tell you when you can start these exercises and which ones will work best for you. How to do the exercises  Shoulder flexion (lying down)    To make a wand for this exercise, use a piece of PVC pipe or a broom handle with the broom removed. Make the wand about a foot wider than your shoulders. 1. Lie on your back, holding a wand with both hands. Your palms should face down as you hold the wand. 2. Keeping your elbows straight, slowly raise your arms over your head. Raise them until you feel a stretch in your shoulders, upper back, and chest.  3. Hold for 15 to 30 seconds. 4. Repeat 2 to 4 times. Shoulder rotation (lying down)    To make a wand for this exercise, use a piece of PVC pipe or a broom handle with the broom removed. Make the wand about a foot wider than your shoulders. 1. Lie on your back. Hold a wand with both hands with your elbows bent and palms up. 2. Keep your elbows close to your body, and move the wand across your body toward the sore arm. 3. Hold for 8 to 12 seconds. 4. Repeat 2 to 4 times. Shoulder internal rotation with towel    1. Hold a towel above and behind your head with the arm that is not sore. 2. With your sore arm, reach behind your back and grasp the towel. 3. With the arm above your head, pull the towel upward. Do this until you feel a stretch on the front and outside of your sore shoulder. 4. Hold 15 to 30 seconds. 5. Repeat 2 to 4 times. Shoulder blade squeeze    1. Stand with your arms at your sides, and squeeze your shoulder blades together. Do not raise your shoulders up as you squeeze. 2. Hold 6 seconds. 3. Repeat 8 to 12 times.   Resisted rows    For this exercise, you will need elastic exercise material, such as surgical tubing or Thera-Band. 1. Put the band around a solid object at about waist level. (A bedpost will work well.) Each hand should hold an end of the band. 2. With your elbows at your sides and bent to 90 degrees, pull the band back. Your shoulder blades should move toward each other. Return to the starting position. 3. Repeat 8 to 12 times. External rotator strengthening exercise    1. Start by tying a piece of elastic exercise material to a doorknob. You can use surgical tubing or Thera-Band. (You may also hold one end of the band in each hand.)  2. Stand or sit with your shoulder relaxed and your elbow bent 90 degrees. Your upper arm should rest comfortably against your side. Squeeze a rolled towel between your elbow and your body for comfort. This will help keep your arm at your side. 3. Hold one end of the elastic band with the hand of the painful arm. 4. Start with your forearm across your belly. Slowly rotate the forearm out away from your body. Keep your elbow and upper arm tucked against the towel roll or the side of your body until you begin to feel tightness in your shoulder. Slowly move your arm back to where you started. 5. Repeat 8 to 12 times. Internal rotator strengthening exercise    1. Start by tying a piece of elastic exercise material to a doorknob. You can use surgical tubing or Thera-Band. 2. Stand or sit with your shoulder relaxed and your elbow bent 90 degrees. Your upper arm should rest comfortably against your side. Squeeze a rolled towel between your elbow and your body for comfort. This will help keep your arm at your side. 3. Hold one end of the elastic band in the hand of the painful arm. 4. Slowly rotate your forearm toward your body until it touches your belly. Slowly move it back to where you started. 5. Keep your elbow and upper arm firmly tucked against the towel roll or at your side. 6. Repeat 8 to 12 times.   Pendulum swing    If you have pain in your back, do not do this exercise. 1. Hold on to a table or the back of a chair with your good arm. Then bend forward a little and let your sore arm hang straight down. This exercise does not use the arm muscles. Rather, use your legs and your hips to create movement that makes your arm swing freely. 2. Use the movement from your hips and legs to guide the slightly swinging arm back and forth like a pendulum (or elephant trunk). Then guide it in circles that start small (about the size of a dinner plate). Make the circles a bit larger each day, as your pain allows. 3. Do this exercise for 5 minutes, 5 to 7 times each day. 4. As you have less pain, try bending over a little farther to do this exercise. This will increase the amount of movement at your shoulder. Follow-up care is a key part of your treatment and safety. Be sure to make and go to all appointments, and call your doctor if you are having problems. It's also a good idea to know your test results and keep a list of the medicines you take. Where can you learn more? Go to http://blanca-jef.info/. Enter H562 in the search box to learn more about \"Shoulder Arthritis: Exercises. \"  Current as of: March 21, 2017  Content Version: 11.4  © 3616-8097 Healthwise[employer]. Care instructions adapted under license by [employer] (which disclaims liability or warranty for this information). If you have questions about a medical condition or this instruction, always ask your healthcare professional. [place] [address] any warranty or liability for your use of this information.

## 2018-03-23 NOTE — PROGRESS NOTES
History of Present Illness  Sandra Oneill is a 43 y.o. male who presents today for management of    Chief Complaint   Patient presents with    Shoulder Pain       Foot Pain  Patient complains of right foot pain, localized to the 4th toe. Onset of the symptoms was several months ago. Inciting event: none known. Current symptoms include worse with weigth-bearing anf toe flexion. Aggravating symptoms: none known. . Patient's overall course: stable. Patient has had prior foot problems. Previous visits for this problem: none. Evaluation to date: none. Treatment to date: none. Shoulder Pain  Patient complains of right side shoulder pain. The symptoms began several years ago Course of symptoms since onset has been waxing and waning. Pain is described as overall severity = mild, location: poorly localized and worse with overhead movements. Symptoms were incited by repetitive activity, rock-climbing. Patient denies hematemesis, melena, fever, possibility of pregnancy, alcohol overuse. Therapy to date includes none. Problem List  Patient Active Problem List    Diagnosis Date Noted    Gastroesophageal reflux disease without esophagitis 05/26/2017       Past Medical History  Past Medical History:   Diagnosis Date    Hypocholesterolemia     WPW (Scott-Parkinson-White syndrome)         Surgical History  Past Surgical History:   Procedure Laterality Date    HX CERVICAL DISKECTOMY      HX HEART VALVE SURGERY      HX WISDOM TEETH EXTRACTION          Current Medications  Current Outpatient Prescriptions   Medication Sig    omeprazole (PRILOSEC) 40 mg capsule Take 40 mg by mouth daily.  multivitamin (ONE A DAY) tablet Take 1 Tab by mouth daily. No current facility-administered medications for this visit.         Allergies/Drug Reactions  No Known Allergies     Family History  Family History   Problem Relation Age of Onset    No Known Problems Mother     Heart Disease Father     Hypertension Father     No Known Problems Sister     No Known Problems Brother         Social History  Social History     Social History    Marital status:      Spouse name: N/A    Number of children: N/A    Years of education: N/A     Occupational History    Not on file. 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 No impingement signs. Left shoulder: No erythema, edema, or bruising. No impingement signs. Foot exam: mild tenderness to palpation over 4th MTP joint, no mass, no open cuts, ulcers or wounds. DP full and equal.       Assessment/Plan:      ICD-10-CM ICD-9-CM    1. Inflammation of metatarsophalangeal joint M19.90 716.97 XR FOOT RT MIN 3 V   2. Chronic right shoulder pain M25.511 719.41 XR SHOULDER RT AP/LAT MIN 2 V    G89.29 338.29      NSAID as needed  Stretching exercises prior to rock climbing. May need referral to sport or physical medicine. Follow-up Disposition:  Return if symptoms worsen or fail to improve. I have discussed the diagnosis with the patient and the intended plan as seen in the above orders. The patient has received an after-visit summary and questions were answered concerning future plans. I have discussed medication side effects and warnings with the patient as well.  I have reviewed the plan of care with the patient, accepted their input and they are in agreement with the treatment goals.        Jennifer Moran MD  March 23, 2018

## 2018-03-26 ENCOUNTER — TELEPHONE (OUTPATIENT)
Dept: FAMILY MEDICINE CLINIC | Age: 38
End: 2018-03-26

## 2018-03-26 DIAGNOSIS — G89.29 CHRONIC RIGHT SHOULDER PAIN: Primary | ICD-10-CM

## 2018-03-26 DIAGNOSIS — M25.511 CHRONIC RIGHT SHOULDER PAIN: Primary | ICD-10-CM

## 2018-03-26 NOTE — TELEPHONE ENCOUNTER
Discuss Xray results with patient. Xray of the right shoulder normal.  Xray of the right foot showed medial fourth metatarsophalangeal joint findings of acute calcific  periarthritis, typically a self-limited juxta-articular deposit of calcium hydroxyapatite or calcium phosphate crystals causing severe joint pain. Plan:  Advised NSAID as needed  Because the shoulder problem has been a longstanding problem and he does rock-climbing, patient would like further work-up and management. Will refer to sports medicine.

## 2018-04-19 ENCOUNTER — OFFICE VISIT (OUTPATIENT)
Dept: ORTHOPEDIC SURGERY | Age: 38
End: 2018-04-19

## 2018-04-19 VITALS
BODY MASS INDEX: 26.05 KG/M2 | HEART RATE: 89 BPM | SYSTOLIC BLOOD PRESSURE: 134 MMHG | DIASTOLIC BLOOD PRESSURE: 75 MMHG | WEIGHT: 203 LBS | TEMPERATURE: 98.1 F | RESPIRATION RATE: 15 BRPM | HEIGHT: 74 IN

## 2018-04-19 DIAGNOSIS — M25.811 TIGHT POSTERIOR CAPSULE OF SHOULDER, RIGHT: ICD-10-CM

## 2018-04-19 DIAGNOSIS — S46.011A ROTATOR CUFF STRAIN, RIGHT, INITIAL ENCOUNTER: Primary | ICD-10-CM

## 2018-04-19 RX ORDER — LIDOCAINE HYDROCHLORIDE 20 MG/ML
1 INJECTION, SOLUTION EPIDURAL; INFILTRATION; INTRACAUDAL; PERINEURAL ONCE
Qty: 1 ML | Refills: 0
Start: 2018-04-19 | End: 2018-04-19

## 2018-04-19 RX ORDER — ACETAMINOPHEN 500 MG
TABLET ORAL
COMMUNITY
End: 2020-01-06

## 2018-04-19 RX ORDER — METHYLPREDNISOLONE ACETATE 40 MG/ML
40 INJECTION, SUSPENSION INTRA-ARTICULAR; INTRALESIONAL; INTRAMUSCULAR; SOFT TISSUE ONCE
Qty: 1 VIAL | Refills: 0
Start: 2018-04-19 | End: 2018-04-19

## 2018-04-19 RX ORDER — MELOXICAM 15 MG/1
TABLET ORAL
Qty: 90 TAB | Refills: 0 | Status: SHIPPED | OUTPATIENT
Start: 2018-04-19 | End: 2020-01-06

## 2018-04-19 NOTE — PROGRESS NOTES
HISTORY OF PRESENT ILLNESS    Pipe Aguilera is a 43y.o. year old male comes in today as new patient to be evaluated and treated at the request of Magaly Lopez MD for my opinion/advice regarding: right shoulder pain    Pain right shoulder for the last few years but bad the last couple months. Pain 5/10 and notes issues with rock climbing which he does very often and wants to be sure he isn't pushing through something serious. Pain over head and no known injury. 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  omeprazole (PRILOSEC) 40 mg capsule Take 40 mg by mouth daily.  multivitamin (ONE A DAY) tablet Take 1 Tab by mouth daily. Past Medical History:   Diagnosis Date    Hypocholesterolemia     WPW (Scott-Parkinson-White syndrome)      Family History   Problem Relation Age of Onset    No Known Problems Mother     Heart Disease Father     Hypertension Father     No Known Problems Sister     No Known Problems Brother          ROS:  No numb, swell. All other systems reviewed and negative aside from that written in the HPI. Objective:  Visit Vitals    /75    Pulse 89    Temp 98.1 °F (36.7 °C)    Resp 15    Ht 6' 2\" (1.88 m)    Wt 203 lb (92.1 kg)    BMI 26.06 kg/m2     GEN:  Appears stated age in NAD. HEAD:  Normocephalic, Atraumatic.   NEURO: Sensation intact light touch B/L upper extremities. right hand dominant. M/S:  Shoulder ROM Normal  bilaterally. Spurling's negative bilaterally  right Shoulder:  Empty can positive External rotation negative. Internal rotation positive. [place] negative. SLAP negative. Load and Shift +1 Anterior, 1 Posterior. Strength +5/5 bilaterally upper extremities. Crossover test negative. Negative atrophy bilaterally. Negative TTP at [facility] [place] joint. Apprehension test negative. Rey-Christiano Test negative. Neer Test negative. Tight posterior capsule right  Yergason's test negative. Speed's test positive. EXT no Clubbing/cyanosis. no edema. SKIN: Warm, dry w/o rash. HEENT: Conjunctiva/lids WNL. External canals/nares WNL. Tongue midline. PERRL, EOMI. Hearing intact. NECK: Trachea midline. Supple, Full ROM. No thyromegaly. CARDIAC: No edema. LUNGS: Normal effort. ABD: Soft, NT. No HSM. PSYCH: A+O x3. Appropriate judgment and insight. Assessment/Plan:     ICD-10-CM ICD-9-CM    1. Rotator cuff strain, right, initial encounter S46.011A 840.4 ARTHROCENTESIS ASPIR&/INJ MAJOR JT/BURSA W/US      meloxicam (MOBIC) 15 mg tablet      lidocaine, PF, (XYLOCAINE) 20 mg/mL (2 %) injection      LIDOCAINE INJECTION      METHYLPREDNISOLONE ACETATE INJECTION 40 MG      methylPREDNISolone acetate (DEPO-MEDROL) 40 mg/mL injection   2. Tight posterior capsule of shoulder, right M25.811 726.2 meloxicam (MOBIC) 15 mg tablet       Patient verbalizes understanding of evaluation and plan. Will use mobic as needed and will start HEP in 3 days after injected today and sleeper stretch and RTC 4 weeks.

## 2018-04-19 NOTE — LETTER
NAME: Artie Canela : 1980 MRN: [id number] CONSENT FOR TREATMENT/PROCEDURE I authorize [facility] to perform the medical treatment and/or procedure(s) described below:  
 
Description of Medical Treatment and/or Procedure(s): (Specify number of treatments or procedures if repeated treatment is recommended) 
 
_____________inject steroid into right shoulder subacromial_________________________ I understand my provider may be assisted with significant procedural tasks by other qualified medical practitioners, who may perform important parts of the treatment/procedure(s) or assist with the administration of analgesia (pain-relieving medications) as necessary for my treatment/procedure(s). These practitioners will only perform tasks within the scope of their licensure and practice, as determined under [place] law and any other applicable regulation(s). Name and Credentials of Practitioners Who May Assist with My Treatment/Procedure(s):  
 
______________________________________________________________________ I understand that a medical company representative may be present during my treatment/procedure(s) to observe and provide verbal, technical advice to my provider. I consent to the participation of this representative in my treatment/procedure(s). My provider has explained that unforeseen conditions may be identified during the performance of my treatment/procedure(s) that necessitate an extension of the original treatment/procedure(s) or the performance of procedures other than those identified above. I consent to the performance of additional treatment/procedure(s) by my provider and the qualified medical practitioners assisting my provider as deemed necessary by my provider for treatment of my medical condition(s).   
 
I understand that certain complications may arise during the use of analgesia during my treatment/procedure(s) that may include, but are not limited to, decreased/altered awareness, respiratory problems, drug reactions, paralysis, brain damage, or possibly, death. I understand that the analgesia required for the performance of my treatment/procedure(s) involves additional risks beyond those of the treatment/procedure(s) to be performed, and authorize the use of analgesia by my provider as deemed necessary for the control of pain during my treatment/procedure(s). I understand that my provider may need to change the type of analgesia or medications used, possibly without explanation to me, and I consent to any such changes as deemed necessary by my provider for treatment of my medical condition(s). I understand that there are risks of infection and other unexpected complications associated with any medical or surgical treatment/procedure including the treatment/procedure(s) listed above or other treatment/procedure(s) necessitated by my medical condition, and that such complications may occur in the absence of any negligence on the part of my healthcare providers. My provider has explained to my satisfaction my medical condition and the specific treatment/procedure(s) recommended and identified above. I have been given an opportunity to ask and have answered to my satisfaction questions about: (CONTINUED PAGE 2) NAME: Abdirahman Cruz : 1980 MRN: [id number]  The nature and extent of the treatment/procedure(s) to be performed;  The benefit of treatment, and the risks associated with not having the recommended treatment/procedure(s);  The risks and possible complications associated with having the treatment, including those which, even though   
   unlikely to occur, may involve serious consequences;  
 Analgesia and alternative forms of analgesia;  Alternative procedures and methods of treatment, and the risk associated with each;  
  The expected consequences of the treatment/procedure(s) on my future health. I understand that no assurance can be given that the treatment/procedure(s) performed will be a success, and no guarantee or warranty of success for the treatment/procedure(s) has been given to me by my provider. I consent to the disposal by the examining Pathologist of any tissue removed during my treatment/procedure(s) in accordance with the receiving hospitals or laboratorys policy and any associated regulations specific to such disposal.  
 
I DO_____  DO NOT___x___ consent to other health care personnel observing my treatment/procedure(s) for the purpose of medical education or other specified purposes as may be explained by my provider. I DO_______ DO NOT___x_____ consent to photography or videotaping of all or any part of my treatment/procedure(s) for medical and/or educational purposes. I understand that my identity will not be revealed in any photographs, videos, or accompanying explanations should these images be used by my healthcare providers. I certify that I have read and fully understand the above Consent for Treatment/Procedure and that all blanks were completed before I signed this consent.  
 
__________Ramone Araujo Beaver_________                      _______________ Print Name of Patient or Legal Representative        Relationship to Patient (if not self) X_______________________________            __________________________ Signature of Patient (or legal representative)  Witness to signature    
 
                       __4/19/2018___/_________AM/PM 
                                                                   Date/Time (If patient is a minor or is unable to sign, complete the following) Patient is a minor, ________ years of age, or is unable to sign due to:______________________ I have explained the nature, purpose and anticipated benefits as well as any possible alternative methods of treatment, the known risks that are involved, and the possibility of complications of the proposed procedure(s) to the patient or patients legal representative. I have provided the patient or legal representative with an opportunity to ask and have answered to their satisfaction any questions about the proposed treatment/procedure(s) and alternative methods of treatment.   
 
Provider Signature:___________________  Date:__4/19/2018__Time:_____________AM/PM

## 2018-04-19 NOTE — PATIENT INSTRUCTIONS
Sleeper Stretch   Main muscles worked: Infraspinatus, teres minor  You should feel this stretch in your outer upper back, behind your shoulder  Equipment needed: None   Repetitions: 4 reps, 3x a day   Days Per Week: Daily   Step-by-step directions  Lie on your side on a firm, flat surface with the affected shoulder under you and your arm bent, as shown. You can place your head on a pillow for comfort, if needed. Use your unaffected arm to push your other arm down. Stop pressing down when you feel a stretch in the back of your affected shoulder. Hold this position for 30 seconds, then relax your arm for 30 seconds. Tip: Do not bend your wrist or press down on your wrist.       [email or web address]    Search YouTube for my channel:    Dr. Jemal Farrell cuff           Rotator Cuff Injury: Care Instructions  Your Care Instructions  The rotator cuff is a group of tendons and muscles around the shoulder that keeps the upper arm bone in place. It keeps the shoulder joint stable and allows you to raise and rotate your arm. Damage to the rotator cuff can be caused by overuse, a fall, or a direct blow to the shoulder area, which can tear the tendons. Over time, everyday wear can damage the tendons and make injury more likely. Treatment can depend upon the amount of damage to the tendons. In a younger person, surgery may be the first choice. But if you are older than 25, you likely have some wear on your rotator cuff. Surgery may not be the most effective treatment. Your doctor may have you try physical therapy and exercise first.  Follow-up care is a key part of your treatment and safety. Be sure to make and go to all appointments, and call your doctor if you are having problems. It's also a good idea to know your test results and keep a list of the medicines you take. How can you care for yourself at home? · Rest your shoulder as much as you can.  If your doctor put your arm in a sling or shoulder immobilizer, wear it as directed. Do not take it off before your doctor tells you to. If it is too tight, loosen it. · Be safe with medicines. Read and follow all instructions on the label. ¨ If the doctor gave you a prescription medicine for pain, take it as prescribed. ¨ If you are not taking a prescription pain medicine, ask your doctor if you can take an over-the-counter medicine. · Put ice or a cold pack on your shoulder for 10 to 20 minutes at a time. Try to do this every 1 to 2 hours for the next 3 days (when you are awake). Put a thin cloth between the ice pack and your skin. · After 3 days, put a warm, wet towel on your shoulder. This is to relax the muscles and help blood flow. · While holding a warm, wet towel on your shoulder, lean forward so your arm hangs freely, and gently swing your arm back and forth like a pendulum. You also can do this standing under a warm shower. · Do not do anything that makes your pain worse. · Follow your doctor's advice about whether you need physical therapy. When should you call for help? Call your doctor now or seek immediate medical care if:  ? · You have severe pain. ? · You cannot move your shoulder or arm. ? · You have tingling or numbness in your arm or hand. ? · Your arm or hand is cool or pale. ? Watch closely for changes in your health, and be sure to contact your doctor if:  ? · Your pain gets worse. ? · You have new or worse swelling in your arm or hand. ? · You do not get better as expected. Where can you learn more? Go to http://blanca-jef.info/. Enter 458 04 [phone] in the search box to learn more about \"Rotator Cuff Injury: Care Instructions. \"  Current as of: March 21, 2017  Content Version: 11.4  © 9044-6173 [employer]. Care instructions adapted under license by [employer] (which disclaims liability or warranty for this information).  If you have questions about a medical condition or this instruction, always ask your healthcare professional. [place] [address] any warranty or liability for your use of this information.

## 2018-04-19 NOTE — MR AVS SNAPSHOT
[address], [address] [facility] [address] 
[phone] Patient: Fito Stein MRN: [id number] :1980 Visit Information Date & Time Provider Department Dept. Phone Encounter #  
 2018  9:00 AM Gita Cancer, [facility] [phone] [id number] Follow-up Instructions Return in about 6 weeks (around 2018) for right shoulder. Routing History Upcoming Health Maintenance Date Due DTaP/Tdap/Td series (2 - Td) 2027 Allergies as of 2018  Review Complete On: 2018 By: Gita Cancer, DO No Known Allergies Current Immunizations  Never Reviewed Name Date Tdap 2017 Not reviewed this visit You Were Diagnosed With   
  
 Codes Comments Rotator cuff strain, right, initial encounter    -  Primary ICD-10-CM: W79.913Y ICD-9-CM: 320. 4 Tight posterior capsule of shoulder, right     ICD-10-CM: M25.811 ICD-9-CM: 726.2 Vitals BP Pulse Temp Resp Height(growth percentile) Weight(growth percentile) 134/75 89 98.1 °F (36.7 °C) 15 6' 2\" (1.88 m) 203 lb (92.1 kg) BMI Smoking Status 26.06 kg/m2 Never Smoker Vitals History BMI and BSA Data Body Mass Index Body Surface Area 26.06 kg/m 2 2.19 m 2 Preferred Pharmacy Pharmacy Name Phone [facility] [place] [address] [phone] Your Updated Medication List  
  
   
This list is accurate as of 18  9:40 AM.  Always use your most recent med list.  
  
  
  
  
 lidocaine (PF) 20 mg/mL (2 %) injection Commonly known as:  XYLOCAINE  
4.61 mL by Other route once for 1 dose. meloxicam 15 mg tablet Commonly known as:  MOBIC Take 1 tab daily as needed pain with food. methylPREDNISolone acetate 40 mg/mL injection Commonly known as:  DEPO-Medrol 1 mL by Intra artICUlar route once for 1 dose. multivitamin tablet Commonly known as:  ONE A DAY Take 1 Tab by mouth daily. PriLOSEC 40 mg capsule Generic drug:  omeprazole Take 40 mg by mouth daily. TYLENOL EXTRA STRENGTH 500 mg tablet Generic drug:  acetaminophen Take  by mouth every six (6) hours as needed for Pain. Prescriptions Sent to Pharmacy Refills  
 meloxicam (MOBIC) 15 mg tablet 0 Sig: Take 1 tab daily as needed pain with food. Class: Normal  
 Pharmacy: Caryl Coffman at [address] #: [phone] We Performed the Following ARTHROCENTESIS ASPIR&/INJ MAJOR JT/BURSA W/US [49506 CPT(R)] LIDOCAINE INJECTION [ HCP] METHYLPREDNISOLONE ACETATE INJECTION 40 MG [ hospitals] Follow-up Instructions Return in about 6 weeks (around 5/31/2018) for right shoulder. Patient Instructions Sleeper Stretch Main muscles worked: Infraspinatus, teres minor You should feel this stretch in your outer upper back, behind your shoulder Equipment needed: None Repetitions: 4 reps, 3x a day Days Per Week: Daily Step-by-step directions Lie on your side on a firm, flat surface with the affected shoulder under you and your arm bent, as shown. You can place your head on a pillow for comfort, if needed. Use your unaffected arm to push your other arm down. Stop pressing down when you feel a stretch in the back of your affected shoulder. Hold this position for 30 seconds, then relax your arm for 30 seconds. Tip: Do not bend your wrist or press down on your wrist.  
 
 
[email or web address] Search YouTube for my channel: 
 
Dr. Marko Bililngs Rotator cuff Rotator Cuff Injury: Care Instructions Your Care Instructions The rotator cuff is a group of tendons and muscles around the shoulder that keeps the upper arm bone in place. It keeps the shoulder joint stable and allows you to raise and rotate your arm. Damage to the rotator cuff can be caused by overuse, a fall, or a direct blow to the shoulder area, which can tear the tendons. Over time, everyday wear can damage the tendons and make injury more likely. Treatment can depend upon the amount of damage to the tendons. In a younger person, surgery may be the first choice. But if you are older than 25, you likely have some wear on your rotator cuff. Surgery may not be the most effective treatment. Your doctor may have you try physical therapy and exercise first. 
Follow-up care is a key part of your treatment and safety. Be sure to make and go to all appointments, and call your doctor if you are having problems. It's also a good idea to know your test results and keep a list of the medicines you take. How can you care for yourself at home? · Rest your shoulder as much as you can. If your doctor put your arm in a sling or shoulder immobilizer, wear it as directed. Do not take it off before your doctor tells you to. If it is too tight, loosen it. · Be safe with medicines. Read and follow all instructions on the label. ¨ If the doctor gave you a prescription medicine for pain, take it as prescribed. ¨ If you are not taking a prescription pain medicine, ask your doctor if you can take an over-the-counter medicine. · Put ice or a cold pack on your shoulder for 10 to 20 minutes at a time. Try to do this every 1 to 2 hours for the next 3 days (when you are awake). Put a thin cloth between the ice pack and your skin. · After 3 days, put a warm, wet towel on your shoulder. This is to relax the muscles and help blood flow. · While holding a warm, wet towel on your shoulder, lean forward so your arm hangs freely, and gently swing your arm back and forth like a pendulum. You also can do this standing under a warm shower. · Do not do anything that makes your pain worse. · Follow your doctor's advice about whether you need physical therapy. When should you call for help? Call your doctor now or seek immediate medical care if: 
? · You have severe pain. ? · You cannot move your shoulder or arm. ? · You have tingling or numbness in your arm or hand. ? · Your arm or hand is cool or pale. ? Watch closely for changes in your health, and be sure to contact your doctor if: 
? · Your pain gets worse. ? · You have new or worse swelling in your arm or hand. ? · You do not get better as expected. Where can you learn more? Go to http://blanca-jef.info/. Enter [phone] in the search box to learn more about \"Rotator Cuff Injury: Care Instructions. \" Current as of: March 21, 2017 Content Version: 11.4 © 9662-8285 [employer]. Care instructions adapted under license by [employer] (which disclaims liability or warranty for this information). If you have questions about a medical condition or this instruction, always ask your healthcare professional. [place] [address] any warranty or liability for your use of this information. Introducing [address]! Dear Guido Chairez: 
Thank you for requesting a [employer] account. Our records indicate that you already have an active [employer] account. You can access your account anytime at [email or web address] Did you know that you can access your hospital and ER discharge instructions at any time in [employer]? You can also review all of your test results from your hospital stay or ER visit. Additional Information If you have questions, please visit the Frequently Asked Questions section of the [employer] website at [email or web address] Remember, [employer] is NOT to be used for urgent needs. For medical emergencies, dial 911. Now available from your iPhone and Android! Please provide this summary of care documentation to your next provider. Your primary care clinician is listed as Caity Darden. If you have any questions after today's visit, please call [phone].

## 2018-04-19 NOTE — PROCEDURES
PROCEDURE NOTE:  Time out: 936am  * Patient was identified by name and date of birth   * Agreement on procedure being performed was verified  * Risks and Benefits explained to the patient  * Procedure site verified and marked as necessary  * Patient was positioned for comfort  * Consent was signed and verified. Risks/benefits including but not limited to bleeding, infection, and scarring discussed and Pt wishes to proceed with procedure. The area was prepped with betadine. Ethyl chloride spray was used, under sterile technique and with ultrasound guidance 1cc of 40mg/cc depomedrol and 40cc 1% lidocaine were injected into right subacromial space. Sterile gauze used to clean the area. Blood loss minimal.  Noticed improvement in pain Sx within 5 minutes (now rated 0/10). Tolerated procedure well. Discussed possible signs/Sx of infxn, and advised to seek care if concerned.

## 2018-05-17 ENCOUNTER — OFFICE VISIT (OUTPATIENT)
Dept: ORTHOPEDIC SURGERY | Age: 38
End: 2018-05-17

## 2018-05-17 VITALS
SYSTOLIC BLOOD PRESSURE: 129 MMHG | TEMPERATURE: 97.7 F | BODY MASS INDEX: 26.49 KG/M2 | WEIGHT: 206.4 LBS | HEART RATE: 68 BPM | DIASTOLIC BLOOD PRESSURE: 72 MMHG | HEIGHT: 74 IN

## 2018-05-17 DIAGNOSIS — M25.811 TIGHT POSTERIOR CAPSULE OF RIGHT SHOULDER: ICD-10-CM

## 2018-05-17 DIAGNOSIS — S46.011D ROTATOR CUFF STRAIN, RIGHT, SUBSEQUENT ENCOUNTER: Primary | ICD-10-CM

## 2018-05-17 NOTE — MR AVS SNAPSHOT
[address], [address] [facility] [address] 
[phone] Patient: Rafiq Jung MRN: [id number] :1980 Visit Information Date & Time Provider Department Dept. Phone Encounter #  
 2018  9:00 AM Mitzy Benitez, 73 [facility] [phone] [id number] Follow-up Instructions Return if symptoms worsen or fail to improve. Upcoming Health Maintenance Date Due Influenza Age 5 to Adult 2018 DTaP/Tdap/Td series (2 - Td) 2027 Allergies as of 2018  Review Complete On: 2018 By: Mitzy Benitez DO No Known Allergies Current Immunizations  Never Reviewed Name Date Tdap 2017 Not reviewed this visit You Were Diagnosed With   
  
 Codes Comments Rotator cuff strain, right, subsequent encounter    -  Primary ICD-10-CM: S46.011D ICD-9-CM: V58.89, 840.4 Tight posterior capsule of right shoulder     ICD-10-CM: M25.811 ICD-9-CM: 726.2 Vitals BP Pulse Temp Height(growth percentile) Weight(growth percentile) BMI  
 129/72 68 97.7 °F (36.5 °C) 6' 2\" (1.88 m) 206 lb 6.4 oz (93.6 kg) 26.5 kg/m2 Smoking Status Never Smoker Vitals History BMI and BSA Data Body Mass Index Body Surface Area  
 26.5 kg/m 2 2.21 m 2 Preferred Pharmacy Pharmacy Name Phone [facility] AT [place] [address] [phone] Your Updated Medication List  
  
   
This list is accurate as of 18  9:30 AM.  Always use your most recent med list.  
  
  
  
  
 meloxicam 15 mg tablet Commonly known as:  MOBIC Take 1 tab daily as needed pain with food. multivitamin tablet Commonly known as:  ONE A DAY Take 1 Tab by mouth daily. PriLOSEC 40 mg capsule Generic drug:  omeprazole Take 40 mg by mouth daily. TYLENOL EXTRA STRENGTH 500 mg tablet Generic drug:  acetaminophen Take  by mouth every six (6) hours as needed for Pain. Follow-up Instructions Return if symptoms worsen or fail to improve. Introducing [facility]! Dear Guido Chairez: 
Thank you for requesting a [employer] account. Our records indicate that you already have an active [employer] account. You can access your account anytime at [email or web address] Did you know that you can access your hospital and ER discharge instructions at any time in [employer]ar? You can also review all of your test results from your hospital stay or ER visit. Additional Information If you have questions, please visit the Frequently Asked Questions section of the [employer] website at [email or web address] Remember, [employer] is NOT to be used for urgent needs. For medical emergencies, dial 911. Now available from your iPhone and Android! Please provide this summary of care documentation to your next provider. Your primary care clinician is listed as Halle Ortega. If you have any questions after today's visit, please call [phone].

## 2018-05-17 NOTE — PROGRESS NOTES
HISTORY OF PRESENT ILLNESS    Kendra suárez a 43y.o. year old male comes in today to be evaluated and treated for: left shoulder    Since last appt has been doing great after injected and mobic w/ HEP and sleeper stretch since last appt. Pain 0/10. No issues with ain and has tried  balls climb at home w/o issues. 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  omeprazole (PRILOSEC) 40 mg capsule Take 40 mg by mouth daily.  multivitamin (ONE A DAY) tablet Take 1 Tab by mouth daily.  meloxicam (MOBIC) 15 mg tablet Take 1 tab daily as needed pain with food. 80 Tab 0     Past Medical History:   Diagnosis Date    Hypocholesterolemia     WPW (Scott-Parkinson-White syndrome)      Family History   Problem Relation Age of Onset    No Known Problems Mother     Heart Disease Father     Hypertension Father     No Known Problems Sister     No Known Problems Brother          ROS:  No swell, numb    Objective:  Visit Vitals    /72    Pulse 68    Temp 97.7 °F (36.5 °C)    Ht 6' 2\" (1.88 m)    Wt 206 lb 6.4 oz (93.6 kg)    BMI 26.5 kg/m2      GEN:  Appears stated age in NAD. HEAD:  Normocephalic, Atraumatic. NEURO:  Sensation intact light touch B/L upper extremities. right hand dominant. M/S:  Shoulder ROM Normal  bilaterally.   Spurling's negative bilaterally  right Shoulder:  Empty can positive External rotation negative. Internal rotation positive. [place] negative. SLAP negative. Load and Shift +1 Anterior, 1 Posterior. Strength +5/5 bilaterally upper extremities. Crossover test negative. Negative atrophy bilaterally. Negative TTP at [facility] joint. Apprehension test negative. Rey-Christiano Test negative. Neer Test negative. Much improved posterior capsule ROM to 30 degrees right  Yergason's test negative. Speed's test positive. EXT no Clubbing/cyanosis. no edema. Assessment/Plan:     ICD-10-CM ICD-9-CM    1. Rotator cuff strain, right, subsequent encounter S46.011D V58.89      840.4    2. Tight posterior capsule of right shoulder M25.811 726.2        Patient verbalizes understanding of evaluation and plan. Will continue HEP and stretch and return progression to climbing and RTC as needed.

## 2019-07-12 ENCOUNTER — OFFICE VISIT (OUTPATIENT)
Dept: FAMILY MEDICINE CLINIC | Age: 39
End: 2019-07-12

## 2019-07-12 VITALS
SYSTOLIC BLOOD PRESSURE: 111 MMHG | BODY MASS INDEX: 25.28 KG/M2 | RESPIRATION RATE: 20 BRPM | TEMPERATURE: 98.8 F | WEIGHT: 197 LBS | HEART RATE: 93 BPM | HEIGHT: 74 IN | DIASTOLIC BLOOD PRESSURE: 75 MMHG | OXYGEN SATURATION: 100 %

## 2019-07-12 DIAGNOSIS — Z01.84 IMMUNITY STATUS TESTING: ICD-10-CM

## 2019-07-12 DIAGNOSIS — Z00.00 NORMAL PHYSICAL EXAMINATION: Primary | ICD-10-CM

## 2019-07-12 NOTE — PROGRESS NOTES
1. Have you been to the ER, urgent care clinic since your last visit? Hospitalized since your last visit? No    2. Have you seen or consulted any other health care providers outside of the [address] [place] [address] since your last visit? Include any pap smears or colon screening.  No

## 2019-07-12 NOTE — PROGRESS NOTES
Subjective     Patient ID:  Vidhya Eagle is a 35 y.o. ( 1980) male who presents for the following:   No chief complaint on file. HPI   He will be starting a masters program [address] [place] [address] in [employer]. Needs vaccine forms filled out. Feeling well overall. Does report mild URI symptoms last few days but seem to be improving. Review of Systems   Constitutional: Negative for appetite change, chills, diaphoresis, fatigue, fever and unexpected weight change. HENT: Positive for congestion. Negative for sore throat. Respiratory: Negative for cough. Cardiovascular: Negative for chest pain and palpitations. Genitourinary: Negative for dysuria. Musculoskeletal: Negative for back pain. Neurological: Negative for dizziness and headaches. Past Medical History, Past Surgery History, Allergies, Social History, and Family History were reviewed and updated.       Past Medical History:   Diagnosis Date    Hypocholesterolemia     WPW (Scott-Parkinson-White syndrome)      Past Surgical History:   Procedure Laterality Date    HX CERVICAL DISKECTOMY      HX ENDOSCOPY      HX HEART VALVE SURGERY      HX WISDOM TEETH EXTRACTION       Family History   Problem Relation Age of Onset    No Known Problems Mother     Heart Disease Father     Hypertension Father     No Known Problems Sister     No Known Problems Brother      Social History     Socioeconomic History    Marital status:      Spouse name: Not on file    Number of children: Not on file    Years of education: Not on file    Highest education level: Not on file   Occupational History    Not on file   Social Needs    Financial resource strain: Not on file    Food insecurity:     Worry: Not on file     Inability: Not on file    Transportation needs:     Medical: Not on file     Non-medical: Not on file   Tobacco Use    Smoking status: Never Smoker    Smokeless tobacco: Never Used   Substance and Sexual Activity    Alcohol use: Yes     Comment: occasional    Drug use: No    Sexual activity: Yes     Partners: Female   Lifestyle    Physical activity:     Days per week: Not on file     Minutes per session: Not on file    Stress: Not on file   Relationships    Social connections:     Talks on phone: Not on file     Gets together: Not on file     Attends Evangelical service: Not on file     Active member of club or organization: Not on file     Attends meetings of clubs or organizations: Not on file     Relationship status: Not on file    Intimate partner violence:     Fear of current or ex partner: Not on file     Emotionally abused: Not on file     Physically abused: Not on file     Forced sexual activity: Not on file   Other Topics Concern    Not on file   Social History Narrative    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  acetaminophen (TYLENOL EXTRA STRENGTH) 500 mg tablet Take  by mouth every six (6) hours as needed for Pain.  meloxicam (MOBIC) 15 mg tablet Take 1 tab daily as needed pain with food. 90 Tab 0    omeprazole (PRILOSEC) 40 mg capsule Take 40 mg by mouth daily. No current facility-administered medications on file prior to visit. Objective     Visit Vitals  /75   Pulse 93   Temp 98.8 °F (37.1 °C) (Oral)   Resp 20   Ht 6' 2\" (1.88 m)   Wt 197 lb (89.4 kg)   SpO2 100%   BMI 25.29 kg/m²     No LMP for male patient. Physical Exam   Constitutional: He is oriented to person, place, and time. He appears well-developed and well-nourished. No distress. Cardiovascular: Normal rate, regular rhythm and normal heart sounds. No murmur heard. Pulmonary/Chest: Effort normal and breath sounds normal. No respiratory distress. Neurological: He is alert and oriented to person, place, and time.    Skin: He is not diaphoretic. Psychiatric: He has a normal mood and affect. LABS     TESTS      Assessment and Plan     1. Normal physical examination      2. Immunity status testing  Form will be completed after lab results. - MEASLES/MUMPS/RUBELLA IMMUNITY; Future  - MEASLES/MUMPS/RUBELLA IMMUNITY      Follow-up and Dispositions    · Return for  or mail form. Risks, benefits, and alternatives of the medications and treatment plan prescribed today were discussed, and patient expressed understanding. Printed after visit summary was given to patient and reviewed. All patient questions and concerns were addressed. Plan follow-up as discussed or as needed if any worsening symptoms or change in condition.            Signed electronically by Niko Christensen DNP, ARACELI-BC

## 2019-07-14 LAB
MEV IGG SER IA-ACNC: 2.8 AI
MUV IGG SER-ACNC: 0.5 AI
RUBV IGG SERPL IA-ACNC: 1.3 AI

## 2019-07-29 ENCOUNTER — TELEPHONE (OUTPATIENT)
Dept: FAMILY MEDICINE CLINIC | Age: 39
End: 2019-07-29

## 2019-07-30 ENCOUNTER — CLINICAL SUPPORT (OUTPATIENT)
Dept: FAMILY MEDICINE CLINIC | Age: 39
End: 2019-07-30

## 2019-07-30 VITALS
RESPIRATION RATE: 20 BRPM | WEIGHT: 197 LBS | OXYGEN SATURATION: 99 % | DIASTOLIC BLOOD PRESSURE: 70 MMHG | BODY MASS INDEX: 25.28 KG/M2 | SYSTOLIC BLOOD PRESSURE: 130 MMHG | TEMPERATURE: 97.4 F | HEIGHT: 74 IN | HEART RATE: 62 BPM

## 2019-07-30 DIAGNOSIS — Z23 NEED FOR MEASLES-MUMPS-RUBELLA (MMR) VACCINE: Primary | ICD-10-CM

## 2019-08-13 ENCOUNTER — DOCUMENTATION ONLY (OUTPATIENT)
Dept: FAMILY MEDICINE CLINIC | Age: 39
End: 2019-08-13

## 2019-08-13 ENCOUNTER — CLINICAL SUPPORT (OUTPATIENT)
Dept: FAMILY MEDICINE CLINIC | Age: 39
End: 2019-08-13

## 2019-08-13 DIAGNOSIS — Z23 NEED FOR MEASLES-MUMPS-RUBELLA (MMR) VACCINE: Primary | ICD-10-CM

## 2019-10-16 ENCOUNTER — CLINICAL SUPPORT (OUTPATIENT)
Dept: FAMILY MEDICINE CLINIC | Age: 39
End: 2019-10-16

## 2019-10-16 VITALS
TEMPERATURE: 98.3 F | HEART RATE: 78 BPM | BODY MASS INDEX: 25.51 KG/M2 | HEIGHT: 74 IN | SYSTOLIC BLOOD PRESSURE: 116 MMHG | DIASTOLIC BLOOD PRESSURE: 67 MMHG | RESPIRATION RATE: 20 BRPM | WEIGHT: 198.8 LBS | OXYGEN SATURATION: 98 %

## 2019-10-16 DIAGNOSIS — Z23 ENCOUNTER FOR IMMUNIZATION: Primary | ICD-10-CM

## 2020-01-06 ENCOUNTER — OFFICE VISIT (OUTPATIENT)
Dept: FAMILY MEDICINE CLINIC | Age: 40
End: 2020-01-06

## 2020-01-06 VITALS
RESPIRATION RATE: 16 BRPM | WEIGHT: 200 LBS | OXYGEN SATURATION: 100 % | HEIGHT: 74 IN | SYSTOLIC BLOOD PRESSURE: 120 MMHG | DIASTOLIC BLOOD PRESSURE: 67 MMHG | HEART RATE: 83 BPM | BODY MASS INDEX: 25.67 KG/M2 | TEMPERATURE: 97 F

## 2020-01-06 DIAGNOSIS — M54.42 ACUTE LEFT-SIDED LOW BACK PAIN WITH LEFT-SIDED SCIATICA: Primary | ICD-10-CM

## 2020-01-06 DIAGNOSIS — M54.16 LUMBAR RADICULOPATHY: ICD-10-CM

## 2020-01-06 RX ORDER — CYCLOBENZAPRINE HCL 10 MG
10 TABLET ORAL
Qty: 30 TAB | Refills: 0 | Status: SHIPPED | OUTPATIENT
Start: 2020-01-06 | End: 2020-01-13 | Stop reason: SDUPTHER

## 2020-01-06 RX ORDER — PREDNISONE 10 MG/1
TABLET ORAL
Qty: 21 TAB | Refills: 0 | Status: SHIPPED | OUTPATIENT
Start: 2020-01-06 | End: 2020-01-13 | Stop reason: ALTCHOICE

## 2020-01-06 NOTE — PROGRESS NOTES
Pt states a few days ago he hurt his back, left side feels tight. Numbness and tingling in left foot, spasms. 1. Have you been to the ER, urgent care clinic since your last visit? Hospitalized since your last visit? no    2. Have you seen or consulted any other health care providers outside of the [address] [place] [address] since your last visit? Include any pap smears or colon screening.  no

## 2020-01-06 NOTE — PATIENT INSTRUCTIONS

## 2020-01-13 ENCOUNTER — OFFICE VISIT (OUTPATIENT)
Dept: FAMILY MEDICINE CLINIC | Age: 40
End: 2020-01-13

## 2020-01-13 VITALS
OXYGEN SATURATION: 99 % | HEIGHT: 74 IN | RESPIRATION RATE: 18 BRPM | TEMPERATURE: 98 F | HEART RATE: 100 BPM | BODY MASS INDEX: 26.05 KG/M2 | WEIGHT: 203 LBS | SYSTOLIC BLOOD PRESSURE: 113 MMHG | DIASTOLIC BLOOD PRESSURE: 72 MMHG

## 2020-01-13 DIAGNOSIS — M54.42 ACUTE LEFT-SIDED LOW BACK PAIN WITH LEFT-SIDED SCIATICA: ICD-10-CM

## 2020-01-13 DIAGNOSIS — M54.16 LUMBAR RADICULOPATHY: ICD-10-CM

## 2020-01-13 RX ORDER — IBUPROFEN 800 MG/1
800 TABLET ORAL
Qty: 50 TAB | Refills: 0 | Status: SHIPPED | OUTPATIENT
Start: 2020-01-13 | End: 2020-01-27 | Stop reason: SDUPTHER

## 2020-01-13 RX ORDER — OXYCODONE AND ACETAMINOPHEN 5; 325 MG/1; MG/1
1 TABLET ORAL
Qty: 20 TAB | Refills: 0 | Status: SHIPPED | OUTPATIENT
Start: 2020-01-13 | End: 2020-01-20

## 2020-01-13 RX ORDER — CYCLOBENZAPRINE HCL 10 MG
10 TABLET ORAL
Qty: 30 TAB | Refills: 0 | Status: SHIPPED | OUTPATIENT
Start: 2020-01-13 | End: 2021-02-19

## 2020-01-13 NOTE — PROGRESS NOTES
Nyla Shankar is a 38 y.o. male who presents today for back pain      1. Have you been to the ER, urgent care clinic since your last visit? Hospitalized since your last visit? no    2. Have you seen or consulted any other health care providers outside of the [address] [place] [address] since your last visit? Include any pap smears or colon screening. no     Health Maintenance reviewed. There are no preventive care reminders to display for this patient.

## 2020-01-13 NOTE — PROGRESS NOTES
History of Present Illness  Elvis Beckham is a 38 y.o. male who presents today for management of    Chief Complaint   Patient presents with    Back Pain       Back Pain  Patient presents for presents evaluation of low back problems. Symptoms have been present for 14 days and include pain in left lower back, (aching, burning in character; 6/10 in severity), numbness in left leg and foot. Initial inciting event: injured after his 15year-old son jumped on his back. Exacerbating factors identifiable by patient are recumbency. Previous lower back problems: yes. Previous treatments: partial lumbar L4-L5 discectomy in 1999, chiropractor, ibuprofen, prednisone and cyclobenzaprine. Problem List  Patient Active Problem List    Diagnosis Date Noted    Gastroesophageal reflux disease without esophagitis 05/26/2017       Past Medical History  Past Medical History:   Diagnosis Date    Hypocholesterolemia     WPW (Scott-Parkinson-White syndrome)         Surgical History  Past Surgical History:   Procedure Laterality Date    HX ENDOSCOPY      HX HEART VALVE SURGERY      HX LUMBAR DISKECTOMY      HX WISDOM TEETH EXTRACTION          Current Medications  Current Outpatient Medications   Medication Sig    cyclobenzaprine (FLEXERIL) 10 mg tablet Take 1 Tab by mouth three (3) times daily as needed for Muscle Spasm(s).  ibuprofen (MOTRIN) 800 mg tablet Take 1 Tab by mouth every eight (8) hours as needed for Pain.  oxyCODONE-acetaminophen (PERCOCET) 5-325 mg per tablet Take 1 Tab by mouth every six (6) hours as needed for Pain for up to 7 days. Max Daily Amount: 4 Tabs.  multivitamin (ONE A DAY) tablet Take 1 Tab by mouth daily. No current facility-administered medications for this visit.         Allergies/Drug Reactions  No Known Allergies     Family History  Family History   Problem Relation Age of Onset    No Known Problems Mother     Heart Disease Father     Hypertension Father     No Known Problems Sister     No Known Problems Brother         Social History  Social History     Socioeconomic History    Marital status:      Spouse name: Not on file    Number of children: Not on file    Years of education: Not on file    Highest education level: Not on file   Occupational History    Not on file   Social Needs    Financial resource strain: Not on file    Food insecurity:     Worry: Not on file     Inability: Not on file    Transportation needs:     Medical: Not on file     Non-medical: Not on file   Tobacco Use    Smoking status: Never Smoker    Smokeless tobacco: Never Used   Substance and Sexual Activity    Alcohol use: Yes     Comment: occasional    Drug use: No    Sexual activity: Yes     Partners: Female   Lifestyle    Physical activity:     Days per week: Not on file     Minutes per session: Not on file    Stress: Not on file   Relationships    Social connections:     Talks on phone: Not on file     Gets together: Not on file     Attends Adventism service: Not on file     Active member of club or organization: Not on file     Attends meetings of clubs or organizations: Not on file     Relationship status: Not on file    Intimate partner violence:     Fear of current or ex partner: Not on file     Emotionally abused: Not on file     Physically abused: Not on file     Forced sexual activity: Not on file   Other Topics Concern    Not on file   Social History Narrative    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 Respiratory: Negative for cough and hemoptysis. Cardiovascular: Negative for chest pain and palpitations. Gastrointestinal: Negative. Genitourinary: Negative. Musculoskeletal: Positive for back pain. Negative for myalgias and neck pain. Neurological: Positive for tingling and sensory change. Negative for weakness.      Physical Exam  Vital signs:   Vitals: 01/13/20 0752   BP: 113/72   Pulse: 100   Resp: 18   Temp: 98 °F (36.7 °C)   TempSrc: Oral   SpO2: 99%   Weight: 203 lb (92.1 kg)   Height: 6' 2\" (1.88 m)       General: alert, oriented, not in distress  Eyes: clear conjunctivae, anicteric sclerae, full and equal ROMs  Lumbar: No rash, ecchymosis, or gross obliquity. No fasciculations. No focal atrophy is noted. No pain with hip ROM. Full range of motion. No tenderness to palpation. SI joints non-tender. Trochanters non tender. Straight leg raising negative. Musculoskeletal: strength 5/5 on both upper and lower extremities. Sensation in the left posterior leg and plantar area of the left foot was decreased to 90% to light touch. Assessment/Plan:        ICD-10-CM ICD-9-CM    1. Acute left-sided low back pain with left-sided sciatica M54.42 724.2 cyclobenzaprine (FLEXERIL) 10 mg tablet     724.3 ibuprofen (MOTRIN) 800 mg tablet      oxyCODONE-acetaminophen (PERCOCET) 5-325 mg per tablet      REFERRAL TO PHYSICAL THERAPY      CANCELED: REFERRAL TO PHYSICAL THERAPY   2. Lumbar radiculopathy M54.16 724.4 cyclobenzaprine (FLEXERIL) 10 mg tablet      REFERRAL TO PHYSICAL THERAPY      CANCELED: REFERRAL TO PHYSICAL THERAPY     Activity as tolerated  Patient declined spine surgery evaluation at this time. Continue conservative treatment. Referral to PT. If with persistent or worsening symptoms, will check lumbar spine MRI. Follow-up and Dispositions    · Return in about 4 weeks (around 2/10/2020) for follow-up. I have discussed the diagnosis with the patient and the intended plan as seen in the above orders. The patient has received an after-visit summary and questions were answered concerning future plans. I have discussed medication side effects and warnings with the patient as well. I have reviewed the plan of care with the patient, accepted their input and they are in agreement with the treatment goals.        Alex Mallory MD  January 13, 2020

## 2020-01-23 ENCOUNTER — HOSPITAL ENCOUNTER (OUTPATIENT)
Dept: PHYSICAL THERAPY | Age: 40
Discharge: HOME | End: 2020-01-23
Payer: COMMERCIAL

## 2020-01-23 PROCEDURE — 97162 PT EVAL MOD COMPLEX 30 MIN: CPT

## 2020-01-23 PROCEDURE — 97110 THERAPEUTIC EXERCISES: CPT

## 2020-01-23 NOTE — PROGRESS NOTES
PHYSICAL THERAPY - DAILY TREATMENT NOTE    Patient Name: Emily Alvarez        Date: 2020  : 1980   YES Patient  Verified  Visit #:   1   of   8-10  Insurance: Payor: Linda Cancino / Plan: Chris Hudson HMO / Product Type: HMO /      In time: 381 Out time: 112   Total Treatment Time: 45     Medicare/BCBS Time Tracking (below)   Total Timed Codes (min):  Na 1:1 Treatment Time:  na     TREATMENT AREA =  Low back pain [M54.5]    SUBJECTIVE    Pain Level (on 0 to 10 scale):  3  / 10   Medication Changes/New allergies or changes in medical history, any new surgeries or procedures? NO    If yes, update Summary List   Subjective Functional Status/Changes:  []  No changes reported   CC:  HPI:    20y ago partial lumbar discectomy but since then he has had on and off pain. When trying to lift at work in summer he did self-rehab that helped. Repeated flare-ups since then with most recent after son jumping on his back. He has had severe numbness/tingling down left LE. PCP provided prednisone, motrin and flexeril. Sees a chiropractor once a week but recently he went 4 times in a week. Reports symptoms improved temporarily. Symptoms are worse in the morning. Has not been on any medication in the past week. Does stretches regularly. 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  Hobbies: Cycling, rock climbing,   PMH: L4-5 partial discectomy   Patient Goals: \"reduce pain, increase strength, range of motion without pain, regain feeling in left foot\"     OBJECTIVE     Physical Therapy Evaluation - Lumbar Spine        OBJECTIVE  Kyphosis:                  [] Increased                 [] Decreased   [x]  WNL  Lordosis:                   [] Increased                 [] Decreased   [x] WNL  Gait:                             WFL       Palpation:  non-TTP                               Stair Negotiation: Ind    Sit<>Stand Transfers: Ind    Bed Mobility: Ind    Active Movements:  ROM  AROM Comments:pain, area   Forward flexion 40-60 50%    Extension 20-30 100%    SB right 20-30 75%    SB left 20-30 75%    Rotation right 5-10 75%     Rotation left 5-10 75%      ROM / Strength                                                   AROM                      PROM                   Strength (1-5)      Left Right Left Right Left Right   Hip Flexion (0-120)         5 5     Extension (0-20)         4+ 5    IR (0-45) 20 30   5 5    ER (0-45) WFL WFL   5 5     Abduction (0-45)         5 5   Knee Flexion (0-135)     5 5     Extension (0)     4+ 5          Repeated Movements   Effects on present pain: produces (DC), abolishes (A), increases (incr), decreases (decr), centralizes (C), peripheral (PH), no effect (NE)    Pre-Test Sx Flexion Repeated Flexion Extension Repeated Extension Repeated SBL Repeated SBR   Sitting     DC            Standing    DC   NE     NE Decr    Lying       NE  NE  DC Decr     Neuro Screen  Myotome/Dermatome/Reflexes:   Comments: L5 and S1 Diminished.  S1 Reflex absent bilaterally, L4 Reflex Absent left LE     Dural Mobility:  SLR Supine:              [] R    [x] L    [x] +    [] -   Crossed SLR Supine:              [] R    [x] L    [x] +    [] -   Slump Test:               [] R    [x] L    [] +    [x] - (degrees):   Prone Knee Bend:     [] R    [x] L    [] +    [x] -      Special Tests             Deficits:                         Ely's:           [x] R    [x] L    [x] +    [] - Hamstrings 90/90:  [] R    [x] L    [x] +    [] -   Outcome Measures:  5 Times Sit to Stand:  EC Single Leg Balance Left: 20\" (increased sway)  Right: 30\"    10 min Therapeutic Exercise:  [x]  See flow sheet   Rationale:      increase ROM and increase strength to improve the patients ability to negotiate various environments in a safe and effective manner. min Patient Education:  YES  Reviewed HEP   []  Progressed/Changed HEP based on: Other Objective/Functional Measures:    See Above  Provided HEP  Educated regarding centralization vs. peripheralization  Advised to avoid flexion based movements     Post Treatment Pain Level (on 0 to 10) scale:   3  / 10     ASSESSMENT    Assessment/Changes in Function:     See POC    Justification for Eval Code Complexity:  Patient History : Mod  Examination: Medium See Objective  Clinical Presentation: Medium Evolving  Clinical Decision Making : Mod - FOTO 46     []  See Progress Note/Recertification   Patient will continue to benefit from skilled PT services to analyze,, cue,, progress,, modify,, demonstrate,, instruct, and address, movement patterns,, therapeutic interventions,, postural abnormalities,, soft tissue restrictions,, ROM,, strength,, functional mobility,, body mechanics/ergonomics, and home and community integration, to attain remaining goals.    Progress toward goals / Updated goals:    See POC     PLAN    []  Upgrade activities as tolerated YES Continue plan of care   []  Discharge due to :    []  Other:      Therapist: Julia Morales DPT    Date: 1/23/2020 Time: 10:29 AM     Future Appointments   Date Time Provider Abril Aleman   1/23/2020 10:30 AM [facility]   2/13/2020 11:00 AM [facility]

## 2020-01-24 NOTE — PROGRESS NOTES
Request for use of Dry Needling/Intramuscular Manual Therapy  Patient: Baldo Denis     Referral Source: Yesenia Waddell MD  Diagnosis: Low back pain [M54.5]      : 1980  Date of initial visit: 2020   Attended visits: 1  Missed Visits: 0    Based on findings from the physical therapy examination and evaluation, the evaluating therapist believes the patient, Baldo Denis  would benefit from including Dry Needling as part of the plan of care. Dry needling is a treatment technique utilized in conjunction with other PT interventions to address and modulate pain through various mechanical means, which may include the following: rowan-neural needling, musculotendinous needling, osteopecking, , and trigger point needling. Dry Needling is an advanced procedure that requires additional training of intensive course work. PROCEDURE:   Solid filament sterile needle (typically 0.3mm/30 gauge) inserted into a trigger point   Repeated movements inactivate the trigger points, taking 30-60 seconds per site   Typically consists of 1 dry needling session per week and a possible second treatment including muscle re-education, flexibility, strengthening and other manual techniques to facilitate the benefits of dry needling    Therapists at our [facility] facility are certified through the [address] via the [employer]. These therapists may also utilize semi-standardized protocols which can incorporate electrical dry needling. This technique utilizes electrical currents along with needles in situ at variable depths and with variable needle lengths/guages for the patient's dysfunction. No needles are left in situ where it is contraindicated.     BENEFITS:   Inactivation of trigger points   Decreased pain   Increased muscle length   Improved movement patterns   Restoration of function   Increased ROM   Improved Blood Flow POTENTIAL RISKS:   Post-needling soreness   Infection   Bruising/bleeding   Penetration of a nerve   Pneumothorax   All treating PTs have been thoroughly educated in avoiding adverse reactions    If you agree with this recommendation, please sign this form and fax it to us at [phone]. If you have questions or concerns regarding dry needling or any other treatment we may be providing, please contact us at [phone]    Thank you for allowing us to assist in the care of your patient. Ren Rutherford    1/24/2020 7:23 AM     NOTE TO PHYSICIAN:  PLEASE COMPLETE THE ORDERS BELOW AND   FAX TO [facility]: [phone]  If you are unable to process this request in 24 hours please contact our office:   [phone]    ? I have read the above request and AGREE to the recommendation of including dry needling as part of the plan of care. ? I have read the above request and DO NOT AGREE to including dry needling as part of the plan of care.   ? I have read the above report and request that my patient continue therapy with the following changes/special instructions:  ________________________________________________________________________    Physicians signature: _______________________________________________     Date: ______________Time:_______________

## 2020-01-27 DIAGNOSIS — M54.42 ACUTE LEFT-SIDED LOW BACK PAIN WITH LEFT-SIDED SCIATICA: ICD-10-CM

## 2020-01-27 RX ORDER — IBUPROFEN 800 MG/1
TABLET ORAL
Qty: 50 TAB | Refills: 0 | Status: SHIPPED | OUTPATIENT
Start: 2020-01-27 | End: 2021-02-19

## 2020-01-30 ENCOUNTER — HOSPITAL ENCOUNTER (OUTPATIENT)
Dept: PHYSICAL THERAPY | Age: 40
Discharge: HOME | End: 2020-01-30
Payer: COMMERCIAL

## 2020-01-30 PROCEDURE — 97140 MANUAL THERAPY 1/> REGIONS: CPT

## 2020-01-30 PROCEDURE — 97110 THERAPEUTIC EXERCISES: CPT

## 2020-01-30 PROCEDURE — 97014 ELECTRIC STIMULATION THERAPY: CPT

## 2020-01-30 NOTE — PROGRESS NOTES
PHYSICAL THERAPY - DAILY TREATMENT NOTE    Patient Name: Olga Vora        Date: 2020  : 1980   YES Patient  Verified  Visit #:   2   of   8-10  Insurance: Payor: Unknown Ades / Plan: Robertreuben May HMO / Product Type: HMO /      In time: 067 Out time: 486   Total Treatment Time: 58     Medicare/BCBS Time Tracking (below)   Total Timed Codes (min):  NA 1:1 Treatment Time:  NA     TREATMENT AREA =  Low back pain [M54.5]    SUBJECTIVE    Pain Level (on 0 to 10 scale):  2-3  / 10   Medication Changes/New allergies or changes in medical history, any new surgeries or procedures? NO    If yes, update Summary List   Subjective Functional Status/Changes:  []  No changes reported     Pt reports compliance c HEP  Pain only when transferring from sitting from standing. OBJECTIVE  Therapeutic Procedures:  Min Procedure Specifics + Rationale   33 [x] Therapeutic Exercise    See Flowsheet   Rationale: increase ROM and increase strength to improve the patients ability to participate in ADL's    10 [x]  Manual Therapy          Dynamic Cupping to Left Lumbar Paraspinals, STM to Left Piriformis c Hip PROM IR/ER  Rationale: decrease pain, increase ROM, increase tissue extensibility, decrease trigger points and increase postural awareness to attain functional use and participation with ADL's           Modality rationale: decrease inflammation, decrease pain, increase tissue extensibility and increase muscle contraction/control to improve the patients ability to perform ADL's with greater ease   Time: 15 Additional Details    [] E-Stim:       [] Att                 [x] Unatt                         [] IFC                [] TENS                           [x] EDN:Electrical DN: 1-4 [email or web address]. [email or web address] gauge solid filament needle for semi-standardized protocol for Lumbar Radiculopathy. Re-wind and flick every 5'. Sweep setting.  Location: L/S  [] supine              [x] prone  [] legs elevatedv   [] legs flat  []w/heat []w/ice  []w/US     []  Traction:   [] Cervical          []Lumbar                          [] Intermitent      []Continuous Step x1 Ramp/hold/ lbs:     Angle:  [] supine              [] prone  [] legs elevated    [] legs flat    []  Heat          [] pre-BILLIE          [] post-BILLIE Location:    [] supine              [] prone  [] legs elevated    [] legs flat    []  Cold Pack  [] pre-BILLIE          [] post-BILLIE  []  Ice massage Location:    [] supine              [] prone  [] legs elevated    [] legs flat    []  Vasopneumatic Device, press/temp                              Location  [] Right Knee []Left Shoulder  [] Left Knee []Right Ankle  [] Right Shoulder [] Left Ankle    Skin assessment post-treatment:  intact no adverse reaction          min Patient Education:  YES Reviewed HEP         Other Objective/Functional Measures:    See flowsheet for more details    DPT and pt discussed potential changes to billing for dry needling. VC and demos required throughout session for proper form and increased safety    Dry Needling Procedure Note    Procedure: An intramuscular manual therapy (dry needling) and a neuro-muscular re-education treatment was done to deactivate myofascial trigger points with a .[id number] gauge filament needle under aseptic technique. Indications:  [] Myofascial pain and dysfunction [] Muscled spasms  [] Myalgia/myositis   [] Muscle cramps  [x] Muscle imbalances  [] Other:  [x] Radiculopathy    Chart reviewed for the following:  Tom Diamond(certified treating therapist), have reviewed the medical history, summary list and precautions/contraindications for [employer].   TIME OUT performed immediately prior to start of procedure:  Kaushik Plummer (certified treating therapist), have performed the following reviews on [employer] prior to the start of the session:      [x] Verified patient identification by name and date of birth    [x] Agreement on all muscles being treated was verified   [x] Purpose of dry needling, side effects, possible complications, risks and benefits were explained to the patient   [x] Procedure site(s) verified  [x] Patient was positioned for comfort and draped for privacy  [x] Informed Consent was signed (initial visit) and verified verbally (subsequent visits)  [x] Patient was instructed on the need to report the use of blood thinners and/or immunosuppressant medications  [x] How to respond to possible adverse effects of treatment  [x] Self treatment of post needling soreness: ice, heat (moist heat, heat wraps) and stretching  [x] Opportunity was given to ask any questions, all questions were answered            Time: 900am  Date of procedure: 1/30/2020    Treatment: The following muscles were treated today with intramuscular dry needling   [x] Left [x] Right Lumbar Multifidi, Rotarores    Performed by: Cem Mcdonald, SUKHDEV,Omar MENDOZA    Patient's response to today's treatment:  [] Latent Twitch Response                 [] Muscle relaxation  [] Pain Relief  [x] Post needling soreness                  [] without complications  [] Increased Range of Motion           [] Decreased headaches         [] Decreased Tinnitus  [] Other:       Progressed therex per flowsheet     Post Treatment Pain Level (on 0 to 10) scale:   0  / 10     ASSESSMENT    Assessment/Changes in Function:     Patient tolerated treatment session well but response to treatment was vague. Will continue to monitor progress and discuss using EDN next week. []  See Progress Note/Recertification   Patient will continue to benefit from skilled PT services to analyze,, cue,, progress,, modify,, demonstrate,, instruct, and address, movement patterns,, therapeutic interventions,, postural abnormalities,, soft tissue restrictions,, ROM,, strength,, functional mobility,, body mechanics/ergonomics, and home and community integration, to attain remaining goals.    Progress toward goals / Updated goals:    1st session since initial evaluation, no notable progress yet.      PLAN     [x]  Upgrade activities as tolerated YES Continue plan of care   []  Discharge due to :    []  Other:      Therapist: Marianela Saavedra DPT    Date: 1/30/2020 Time: 8:24 AM     Future Appointments   Date Time Provider Abril Aleman   1/30/2020  8:30 AM [facility]   1/31/2020  1:30 PM Columba [facility]   [facility]/2020  [facility]  [facility]   2/13/2020 11:00 AM [facility]   2/13/2020  2:00 PM Columba [facility]   2/18/2020  8:00 AM [facility]   2/20/2020 11:00 AM Sree [place]   2/25/2020  8:00 AM [facility]   2/27/2020 [facility]

## 2020-01-31 ENCOUNTER — HOSPITAL ENCOUNTER (OUTPATIENT)
Dept: PHYSICAL THERAPY | Age: 40
Discharge: HOME | End: 2020-01-31
Payer: COMMERCIAL

## 2020-01-31 PROCEDURE — 97530 THERAPEUTIC ACTIVITIES: CPT

## 2020-01-31 PROCEDURE — 97110 THERAPEUTIC EXERCISES: CPT

## 2020-01-31 NOTE — PROGRESS NOTES
PHYSICAL THERAPY - DAILY TREATMENT NOTE    Patient Name: Wing Melara        Date: 2020  : 1980   yes Patient  Verified  Visit #:   3  of  8-10  Insurance: Payor: Wilbert Wheeler / Plan: Sera Boykin HMO / Product Type: HMO /      In time: 132 Out time: 225   Total Treatment Time: 53     TREATMENT AREA =  Low back pain [M54.5]    SUBJECTIVE  Pain Level (on 0 to 10 scale):  6  / 10   Medication Changes/New allergies or changes in medical history, any new surgeries or procedures?    no  If yes, update Summary List   Subjective Functional Status/Changes:  []  No changes reported     \"I had a good night last night.  After here I saw my chiropractor and he was able to adjust this left side more so I think the needling helped for sure\"          OBJECTIVE        45 min Therapeutic Exercise:  [x]  See flow sheet   Rationale:      increase ROM and increase strength to improve the patients ability to safely perform ADLs/ bending/stooping/rock climbing/cycling/ lifting/prolong sitting, stding and amb/ stairs with minimal to no pain           8 min Therapeutic Activity: [x]  See flow sheet  postural/bed mobility training  sit <>std without UE   Rationale:    increase ROM, increase strength and improve coordination to improve the patients ability to safely perform ADLs/ bending/stooping/rock climbing/cycling/ lifting/prolong sitting, stding and amb/ stairs with minimal to no pain      Billed With/As:   [] TE   [x] TA   [] Neuro   [] Self Care Patient Education: [x] Review HEP    [] Progressed/Changed HEP based on:   [x] positioning   [x] body mechanics   [x] transfers   [] heat/ice application    [x] other: Pt ed on importance and benefits of compliance with HEP, core strength/stability and proper posture; pt verbalized understanding       Other Objective/Functional Measures:  PRIYA>REIL= NE/NE  PRIYA HOC R-NE/NE- produced LLE sxs in positioned, subsided in standing  Rep LSG in doorway>at wall- NE/NE  Step stance left with left reach-  supine BKTC hips right=B/B. Instructed to perform every hr x 10; pt verbalized understanding  able to maintain mild sxs for all TE     Post Treatment Pain Level (on 0 to 10) scale:   0  / 10     ASSESSMENT  Assessment/Changes in Function:     possible lateral component with a posterior derangement, able to decrease with MDT     []  See Progress Note/Recertification   Patient will continue to benefit from skilled PT services to modify and progress therapeutic interventions, address functional mobility deficits, address ROM deficits, address strength deficits, analyze and address soft tissue restrictions, analyze and cue movement patterns, analyze and modify body mechanics/ergonomics, assess and modify postural abnormalities and instruct in home and community integration to attain remaining goals. Progress toward goals / Updated goals: · Short Term Goals: To be accomplished in  3  weeks:  1. Pt will be compliant with HEP for symptom management at home. MET  2. Pt will be able to centralize symptoms to improve quality of life. · Long Term Goals: To be accomplished in  4-5  weeks:  1. Pt will be independent with HEP at D/C for self management. 2. Pt will demonstrate left hip internal rotation arom to 25 degrees to improve functional mechanics. 3. Pt will demonstrate left knee extension strength of at least 5/5 to engage in recreational activities. 4. Patient will increase FOTO Functional Status score to 62 to decrease functional limitations.      PLAN  [x]  Upgrade activities as tolerated yes Continue plan of care   []  Discharge due to :    []  Other:      Therapist: Reina Lion PTA    Date: 1/31/2020 Time: 2:50 PM     Future Appointments   Date Time Provider Abril Aleman   2/4/2020  9:00 AM Eusebio Sanchez [address] [place] [address]   2/7/2020  8:00 AM Eusebio Sanchez [address] [place] [address]   2/13/2020 11:00 AM MD SAMANTA Shay Plan   2/13/2020  2:00 PM Nadine Strange PTA [facility]   2/18/2020 8:00 AM [facility]   2/20/2020 11:00 AM Caesar Camacho PT [facility]   2/25/2020  8:00 AM [facility]   2/27/2020 11:00 AM Caesar Camacho PT [facility]

## 2020-02-04 ENCOUNTER — HOSPITAL ENCOUNTER (OUTPATIENT)
Dept: PHYSICAL THERAPY | Age: 40
Discharge: HOME | End: 2020-02-04
Payer: COMMERCIAL

## 2020-02-04 PROCEDURE — 97530 THERAPEUTIC ACTIVITIES: CPT

## 2020-02-04 PROCEDURE — 97110 THERAPEUTIC EXERCISES: CPT

## 2020-02-04 PROCEDURE — 97140 MANUAL THERAPY 1/> REGIONS: CPT

## 2020-02-04 NOTE — PROGRESS NOTES
PHYSICAL THERAPY - DAILY TREATMENT NOTE    Patient Name: Nasrin Slice        Date: 2020  : 1980   YES Patient  Verified  Visit #:   4   of   8-10  Insurance: Payor: Albina Killian / Plan: Kimberley Romero HMO / Product Type: HMO /      In time: 9:00 Out time: 9:50   Total Treatment Time: 50     TREATMENT AREA = Low back pain [M54.5]    SUBJECTIVE    Pain Level (on 0 to 10 scale):  2-3  / 10   Medication Changes/New allergies or changes in medical history, any new surgeries or procedures? NO    If yes, update Summary List   Subjective Functional Status/Changes:  []  No changes reported     \"I'd say it's more of an irritation right now. It's numb to the foot. I would say it is a little better with the bends. I'm definitely moving better but last night I shifted myself in bed and twisted a bit. \"          OBJECTIVE    Therapeutic Procedures:  Min Procedure Specifics + Rationale   n/a [x]  Patient Education (performed throughout session) [x] Review HEP    [] Progressed/Changed HEP based on:   [] proper performance and advancement of Therex/TA   [] reduction in pain level    [] increased functional capacity       [] change in directional preference   15 [x] Therapeutic Exercise   [x]  See Flowsheet   Rationale: increase ROM and increase strength to improve the patients ability to participate in ADL's    25 []  Manual Therapy       [] IASTM    [] TDN (see objective; actual needle insertion time not billed)   [x] REIL with OP; Extension mobilization in prone  REIL with OP; Extension mobilization in prone with HIPS OFF CENTER RIGHT  Flexion Rotation Mobilization to Right (LE to Left).   Rationale: decrease pain, increase ROM, increase tissue extensibility, decrease trigger points and increase postural awareness to attain functional use and participation with ADL's  [x] Skin assessment post-treatment:  [x]intact []redness- no adverse reaction   []redness - adverse   10 [x] Therapeutic Activity   [x]  See Flowsheet  MDT re-assessment of directional preference  Rationale: To improve safety, proprioception, coordination, and efficiency with tasks       Other Objective/Functional Measures:    Patient VU re: DN changes in policy. Initiated rx with neurodynamic flossing and tensioning, followed by MDT. Neutral extensions produces \"pinching sensation\" to 5th digit on left, HOC right abolished pain to leg but increased L/S discomfort, NE numbness. Post Treatment Pain Level (on 0 to 10) scale:   0 pain, mild numbness to foot  / 10     ASSESSMENT    Assessment/Changes in Function:     Relevant Left Lateral component noted as patient  Able to temporarily abolish left foot numbness while at end range of mobilization. Patient will continue to benefit from skilled PT services to modify and progress therapeutic interventions, address functional mobility deficits, address ROM deficits, address strength deficits, analyze and address soft tissue restrictions, analyze and cue movement patterns, analyze and modify body mechanics/ergonomics and instruct in home and community integration  to attain remaining goals   Progress toward goals / Updated goals:    Improved ability to centralize. PLAN    [x]  Upgrade activities as tolerated  [x]  Update interventions per flow sheet YES Continue plan of care   []  Discharge due to :    []  Other:      Therapist: Louisa Wong \"BJ\" SUKHDEV West, Cert. MDT, Cert. DN, Cert. SMT, Dip.  [employer]    Date: 2/4/2020 Time: 9:01 AM     Future Appointments   Date Time Provider Abril Aleman   2[facility]2020  [facility]   2/13/2020 11:00 AM [facility]   2/13/2020  2:00 PM Velma Power [facility]   2/18/2020  8:00 AM [facility]   2/20/2020 [facility]:00 [facility]   2/25/2020  8:00 AM [facility]   2/27/2020 11:00 AM [facility]

## 2020-02-07 ENCOUNTER — HOSPITAL ENCOUNTER (OUTPATIENT)
Dept: PHYSICAL THERAPY | Age: 40
Discharge: HOME | End: 2020-02-07
Payer: COMMERCIAL

## 2020-02-07 PROCEDURE — 97014 ELECTRIC STIMULATION THERAPY: CPT

## 2020-02-07 PROCEDURE — 97530 THERAPEUTIC ACTIVITIES: CPT

## 2020-02-07 PROCEDURE — 97140 MANUAL THERAPY 1/> REGIONS: CPT

## 2020-02-07 NOTE — PROGRESS NOTES
PHYSICAL THERAPY - DAILY TREATMENT NOTE    Patient Name: Olga Vora        Date: 2020  : 1980   YES Patient  Verified  Visit #:   5   of   8-10  Insurance: Payor: Unknown Ades / Plan: Robert May HMO / Product Type: HMO /      In time: 7:55 Out time: 8:55   Total Treatment Time: 60     TREATMENT AREA = Low back pain [M54.5]    SUBJECTIVE    Pain Level (on 0 to 10 scale):  3  / 10   Medication Changes/New allergies or changes in medical history, any new surgeries or procedures? NO    If yes, update Summary List   Subjective Functional Status/Changes:  []  No changes reported     \"Right now it's in the back of the thigh, tightness to the knee. Nothing really in the calf. Haven't had much pain below the knee, just numbness in the foot. . I felt great after I left here on Monday. The side glides kept it manageable as I worked throughout the day\"          OBJECTIVE     Therapeutic Procedures:  Min Procedure Specifics + Rationale   n/a [x]  Patient Education (performed throughout session) [x] Review HEP    [] Progressed/Changed HEP based on:   [] proper performance and advancement of Therex/TA   [] reduction in pain level    [] increased functional capacity       [] change in directional preference   30 [x]  Manual Therapy       [x] IASTM - Static Cupping to lumbar paraspinals  [] TDN (see objective; actual needle insertion time not billed)   [x] flexion rotation mobilization to left  REIL with OP; Extension mobilization in prone with hips off center right. Rationale: decrease pain, increase ROM, increase tissue extensibility, decrease trigger points and increase postural awareness to attain functional use and participation with ADL's  [x] Skin assessment post-treatment:  [x]intact []redness- no adverse reaction   []redness - adverse   15(15) [x] Therapeutic Activity   [x]  See Flowsheet  REIL with HOC with static cups  Bird Dog progression with static cupping. Rationale:  To improve safety, proprioception, coordination, and efficiency with tasks     Modality rationale: decrease inflammation, decrease pain, increase tissue extensibility and increase muscle contraction/control to improve the patients ability to perform ADL's with greater ease       Min Type Additional Details   15 [x] E-Stim Type:Biphasic Symmetrical  Attended? NO Location: L/S  [] supine              [x] prone  [] legs elevated   [x] legs flat off mat  []w/heat  []w/ice  [] sitting    [x] Skin assessment post-treatment:  [x]intact [x]redness- no adverse reaction       []redness - adverse reaction:     Other Objective/Functional Measures:  Patient educated on purpose of Instrument Assisted Soft Tissue Mobilization, neurophysiological effects, biochemical effects, and biomechanical effects in relation to his condition. He verbalized understanding. Patient assessed for appropriateness pre-IASTM, after which consent to proceed was given. Therapist discussed post-IATSM effects and any adverse reactions (if appropriate) to determine further PT intervention through IASTM. Progressive reduction in foot numbness with flexion rotation mobilization   Discussed travel plans as patient may be driving to [facility] this weekend.      Post Treatment Pain Level (on 0 to 10) scale:   0  / 10     ASSESSMENT    Assessment/Changes in Function:       Patient's response to IASTM:  [] Increased proprioceptive/kinesthetic awareness  [x] Muscle relaxation [] Pain Relief  [x] Redness [x] without complications  [x] Increased Range of Motion  [] Other:            Patient will continue to benefit from skilled PT services to modify and progress therapeutic interventions, address functional mobility deficits, address ROM deficits, address strength deficits, analyze and address soft tissue restrictions, analyze and cue movement patterns, analyze and modify body mechanics/ergonomics and instruct in home and community integration  to attain remaining goals   Progress toward goals / Updated goals:    Derangement reducing. PLAN    [x]  Upgrade activities as tolerated  [x]  Update interventions per flow sheet YES Continue plan of care   []  Discharge due to :    []  Other:      Therapist: Nadeem Mosher \"BJ\" BABS WestT, Cert. MDT, Cert. DN, Cert. SMT, Dip.  [employer]    Date: 2/7/2020 Time: 7:58 AM     Future Appointments   Date Time Provider Abril Aleman   2/7/2020  8:00 AM [facility]   2/13/2020 11:00 AM [facility]   2/13/2020  2:00 PM Dar Recio [facility]   2/18/2020  8:00 AM [facility]   2/20/2020 11:00 AM [facility]   2/25/2020  8:00 AM [facility]   2/27/2020 11:00 AM [facility]

## 2020-02-13 ENCOUNTER — HOSPITAL ENCOUNTER (OUTPATIENT)
Dept: PHYSICAL THERAPY | Age: 40
Discharge: HOME | End: 2020-02-13
Payer: COMMERCIAL

## 2020-02-13 PROCEDURE — 97140 MANUAL THERAPY 1/> REGIONS: CPT

## 2020-02-13 PROCEDURE — 97110 THERAPEUTIC EXERCISES: CPT

## 2020-02-13 NOTE — PROGRESS NOTES
PHYSICAL THERAPY - DAILY TREATMENT NOTE    Patient Name: Bharat Jacobo        Date: 2020  : 1980   yes Patient  Verified  Visit #:   3  of  8-10  Insurance: Payor: Luis A Leon / Plan: [address] [place] [address] [employer] / Product Type: HMO /      In time: 132 Out time: 225   Total Treatment Time: 53     TREATMENT AREA =  Low back pain [M54.5]    SUBJECTIVE  Pain Level (on 0 to 10 scale):  1.5  / 10   Medication Changes/New allergies or changes in medical history, any new surgeries or procedures?    no  If yes, update Summary List   Subjective Functional Status/Changes:  []  No changes reported     \"I was doing the planks and its making the foot sxs burn. I am not able to relieve with the trunk rot right supine. \"  \"[place] road [place] and it was not bad, or terrible. I went to a 2.5 hr concert and then on the drive home I had the seat heaters on, and I felt good. I felt good today for the most part today too\"          OBJECTIVE        38 min Therapeutic Exercise:  [x]  See flow sheet   Rationale:      increase ROM and increase strength to improve the patients ability to safely perform ADLs/ bending/stooping/rock climbing/cycling/ lifting/prolong sitting, stding and amb/ stairs with minimal to no pain    15 min Manual Therapy:  Op with trunk rot right + flex in supine  OP to L/s with bed elevated HOC right> + with REIL   Rationale:      decrease pain, increase ROM, increase tissue extensibility, decrease trigger points and increase postural awareness to improve patient's ability to safely perform ADLs/ bending/stooping/ lifting/prolong sitting, stding and amb/ stairs with minimal to no pain      Billed With/As:   [] TE   [x] TA   [] Neuro   [] Self Care Patient Education: [x] Review HEP    [] Progressed/Changed HEP based on:   [x] positioning   [x] body mechanics   [x] transfers   [] heat/ice application    [x] other: Pt ed on importance and benefits of compliance with HEP, core strength/stability and proper posture; pt verbalized understanding       Other Objective/Functional Measures:  peripheralized with supine Right lateral rot able to   VCs + demo to perform proper technique for TE  REIL HOC right ( with bed elevated)= B/B> with therapist OP> B/B decrease sxs  demos proper squats to 90 degs without periph   added lacrosse ball with seated piriformis x 1' x2, decreased sxs  no increase in sxs with quad TE   Post Treatment Pain Level (on 0 to 10) scale:   1.5  / 10     ASSESSMENT  Assessment/Changes in Function:   able to return to baseline with rep stance step left with left rot   []  See Progress Note/Recertification   Patient will continue to benefit from skilled PT services to modify and progress therapeutic interventions, address functional mobility deficits, address ROM deficits, address strength deficits, analyze and address soft tissue restrictions, analyze and cue movement patterns, analyze and modify body mechanics/ergonomics, assess and modify postural abnormalities and instruct in home and community integration to attain remaining goals. Progress toward goals / Updated goals: · Short Term Goals: To be accomplished in  3  weeks:  1. Pt will be compliant with HEP for symptom management at home. MET  2. Pt will be able to centralize symptoms to improve quality of life. continues to have left lateral foot numbness, no LLE sxs  · Long Term Goals: To be accomplished in  4-5  weeks:  1. Pt will be independent with HEP at D/C for self management. 2. Pt will demonstrate left hip internal rotation arom to 25 degrees to improve functional mechanics. 3. Pt will demonstrate left knee extension strength of at least 5/5 to engage in recreational activities. 4. Patient will increase FOTO Functional Status score to 62 to decrease functional limitations.      PLAN  [x]  Upgrade activities as tolerated yes Continue plan of care   []  Discharge due to :    []  Other:      Therapist: Rodo Mercado PTA    Date: 2/13/2020 Time: 2:50 PM     Future Appointments   Date Time Provider Abril Aleman   2/18/2020  8:00 AM Marisa Kumar [facility]   2/20/2020 11:00 AM Amanda Avila PT [facility]   2/25/2020  8:00 AM Marisa [facility]   2/27/2020 11:00 AM Amanda Avila PT [facility]

## 2020-02-18 ENCOUNTER — HOSPITAL ENCOUNTER (OUTPATIENT)
Dept: PHYSICAL THERAPY | Age: 40
Discharge: HOME | End: 2020-02-18
Payer: COMMERCIAL

## 2020-02-18 PROCEDURE — 97140 MANUAL THERAPY 1/> REGIONS: CPT

## 2020-02-18 PROCEDURE — 97110 THERAPEUTIC EXERCISES: CPT

## 2020-02-18 NOTE — PROGRESS NOTES
PHYSICAL THERAPY - DAILY TREATMENT NOTE    Patient Name: Baldo Denis        Date: 2020  : 1980   YES Patient  Verified  Visit #:   6   of   8-10  Insurance: Payor: Oneal Cabrera / Plan: [facility] H[employer] / Product Type: HMO /      In time: 800 Out time: 900   Total Treatment Time: 60     Medicare/[facility] Time Tracking (below)   Total Timed Codes (min):  NA 1:1 Treatment Time:  NA     TREATMENT AREA =  Low back pain [M54.5]    SUBJECTIVE    Pain Level (on 0 to 10 scale):  1  / 10   Medication Changes/New allergies or changes in medical history, any new surgeries or procedures? NO    If yes, update Summary List   Subjective Functional Status/Changes:  []  No changes reported     Pt reports that he feels more mobile and less LB muscle spasms but his left foot symptoms have no improved since starting therapy. He states right sideglides (left hips off center) standing help with LB symptoms but standing right hips off center he has not attempted at home due to increased pain. Therapist educated pt about intended responses with esteban based movements. Pt and Therapist discussed potentially contacting referring physician secondary to lack of progress with therapy.         OBJECTIVE  Therapeutic Procedures:  Min Procedure Specifics + Rationale   10 [x] Therapeutic Exercise    See Flowsheet   Rationale: increase ROM and increase strength to improve the patients ability to participate in ADL's    50 [x]  Manual Therapy          STM Left Piriformis with Hip IR/ER PROM  Static Cupping Left HS Bulk and Gastroc/Soleus c Ankle Pumps in Prone  Left Fibular Head Mobilizations AP Grade 2-3     Rationale: decrease pain, increase ROM, increase tissue extensibility, decrease trigger points and increase postural awareness to attain functional use and participation with ADL's        min Patient Education:  YES Reviewed HEP         Other Objective/Functional Measures:    See flowsheet for more details  (-) Tinels Sign in Left LE, NE: HIP, Knee, DF PROM end range    Palpation ~1 inch posterior and inferior caused \"sensation\" in foot     Nerve Glides Performed Before and After Manual Therapy     VC and demos required throughout session for proper form and increased safety    Progressed therex per flowsheet     Post Treatment Pain Level (on 0 to 10) scale:   1  / 10     ASSESSMENT    Assessment/Changes in Function:     Patient tolerated treatment session well and is progressing well towards goals. Treated nerve pathway with STM versus repeated lumbar movements to trial a different approach secondary to lack of progress. No Change in symptoms at end of session     []  See Progress Note/Recertification   Patient will continue to benefit from skilled PT services to analyze,, cue,, progress,, modify,, demonstrate,, instruct, and address, movement patterns,, therapeutic interventions,, postural abnormalities,, soft tissue restrictions,, ROM,, strength,, functional mobility,, body mechanics/ergonomics, and home and community integration, to attain remaining goals. Progress toward goals / Updated goals:    Minimal Progress Towards goals.       PLAN    [x]  Upgrade activities as tolerated YES Continue plan of care   []  Discharge due to :    []  Other:      Therapist: Stevo Archuleta DPT    Date: 2/18/2020 Time: 8:12 AM     Future Appointments   Date Time Provider Abril Aleman   2/20/2020 11:00 AM Yovany [facility]   2/25/2020  8:00 AM Godfrey [place] [facility]   2/27/2020 11:00 AM Yovany [facility]

## 2020-02-19 ENCOUNTER — PATIENT MESSAGE (OUTPATIENT)
Dept: FAMILY MEDICINE CLINIC | Age: 40
End: 2020-02-19

## 2020-02-19 DIAGNOSIS — M54.42 CHRONIC LEFT-SIDED LOW BACK PAIN WITH LEFT-SIDED SCIATICA: ICD-10-CM

## 2020-02-19 DIAGNOSIS — G89.29 CHRONIC LEFT-SIDED LOW BACK PAIN WITH LEFT-SIDED SCIATICA: ICD-10-CM

## 2020-02-19 DIAGNOSIS — M54.16 LUMBAR RADICULOPATHY: Primary | ICD-10-CM

## 2020-02-20 ENCOUNTER — HOSPITAL ENCOUNTER (OUTPATIENT)
Dept: PHYSICAL THERAPY | Age: 40
Discharge: HOME | End: 2020-02-20
Payer: COMMERCIAL

## 2020-02-20 PROCEDURE — 97530 THERAPEUTIC ACTIVITIES: CPT

## 2020-02-20 PROCEDURE — 97140 MANUAL THERAPY 1/> REGIONS: CPT

## 2020-02-20 PROCEDURE — 97110 THERAPEUTIC EXERCISES: CPT

## 2020-02-20 NOTE — PROGRESS NOTES
PHYSICAL THERAPY - DAILY TREATMENT NOTE    Patient Name: Jairo Whelan        Date: 2020  : 1980   YES Patient  Verified  Visit #:   -  Insurance: Payor: Cindy Hernandez / Plan: [employer] / Product Type: HMO /      In time: 11:00 Out time: 11:51   Total Treatment Time: 51     TREATMENT AREA = Low back pain [M54.5]    SUBJECTIVE    Pain Level (on 0 to 10 scale):  1  / 10   Medication Changes/New allergies or changes in medical history, any new surgeries or procedures? NO    If yes, update Summary List   Subjective Functional Status/Changes:  []  No changes reported     \"Pain's ok. I tried to do the side glides every hour. The pain is almost non-existent but no real change with the numbness. I have noticed strength in my calf though so that I'm able to go up and down steps now. I'm not running up the stairs but it's a lot better than before.   \"          OBJECTIVE    Therapeutic Procedures:  Min Procedure Specifics + Rationale   n/a [x]  Patient Education (performed throughout session) [x] Review HEP    [] Progressed/Changed HEP based on:   [] proper performance and advancement of Therex/TA   [] reduction in pain level    [] increased functional capacity       [] change in directional preference   18 [x] Therapeutic Exercise   [x]  See Flowsheet   Rationale: increase ROM and increase strength to improve the patients ability to participate in ADL's    25 []  Manual Therapy       [] IASTM  Static cupping to L/S and left calf  [] TDN (see objective; actual needle insertion time not billed)   [x] flexion rotation mobilization to right (LE to left)    Rationale: decrease pain, increase ROM, increase tissue extensibility, decrease trigger points and increase postural awareness to attain functional use and participation with ADL's  [x] Skin assessment post-treatment:  [x]intact []redness- no adverse reaction   []redness - adverse   10 [x] Therapeutic Activity   [x]  See Flowsheet  Prone AROM knee flexion with static cupping  HR with static cupping on calf  Incline board with static cupping on calf  Rationale: To improve safety, proprioception, coordination, and efficiency with tasks       Other Objective/Functional Measures: Added Nu-Step. Report of transient left gluteal pain during but now worse. Altered between manual/statbility therex   Resumed low plank (hardstyle) and added side plank. Post Treatment Pain Level (on 0 to 10) scale:   0  / 10     ASSESSMENT    Assessment/Changes in Function:     report of near abolishment of paraesthesia to foot with cups placed. Gradual return at end of treatment after cups were removed. Patient will continue to benefit from skilled PT services to modify and progress therapeutic interventions, address functional mobility deficits, address ROM deficits, address strength deficits, analyze and address soft tissue restrictions, analyze and cue movement patterns, analyze and modify body mechanics/ergonomics, assess and modify postural abnormalities, address imbalance/dizziness and instruct in home and community integration  to attain remaining goals   Progress toward goals / Updated goals:    Pain resolving but numbness persisting. Discussed with patient wrapping up PT and graduating in 2 sessions. PLAN    [x]  Upgrade activities as tolerated  [x]  Update interventions per flow sheet YES Continue plan of care   []  Discharge due to :    []  Other:      Therapist: Art Juarez \"BJ\" SUKHDEV [place], Cert. MDT, Cert. DN, Cert. SMT, Dip.  Osteopractic    Date: 2/20/2020 Time: 11:07 AM     Future Appointments   Date Time Provider Abril Aleman   2/25/2020  8:00 AM Emy Siegel [facility]   2/27/2020 11:00 AM Alaina Valenzuela PT [facility]

## 2020-02-25 ENCOUNTER — HOSPITAL ENCOUNTER (OUTPATIENT)
Dept: PHYSICAL THERAPY | Age: 40
Discharge: HOME | End: 2020-02-25
Payer: COMMERCIAL

## 2020-02-25 PROCEDURE — 97110 THERAPEUTIC EXERCISES: CPT

## 2020-02-25 PROCEDURE — 97140 MANUAL THERAPY 1/> REGIONS: CPT

## 2020-02-25 NOTE — PROGRESS NOTES
PHYSICAL THERAPY - DAILY TREATMENT NOTE    Patient Name: Nyla Shankar        Date: 2020  : 1980   YES Patient  Verified  Visit #:   8   of   10  Insurance: Payor: Dianna Johnson / Plan: [employer] / Product Type: HMO /      In time: 800 Out time: 850   Total Treatment Time: 50     Medicare/[employer] Time Tracking (below)   Total Timed Codes (min):  NA 1:1 Treatment Time:  Na     TREATMENT AREA =  Low back pain [M54.5]    SUBJECTIVE    Pain Level (on 0 to 10 scale):  1.5  / 10   Medication Changes/New allergies or changes in medical history, any new surgeries or procedures? NO    If yes, update Summary List   Subjective Functional Status/Changes:  []  No changes reported     Pt reports that last session his numbness in foot diminished but once treatment ended symptoms returned. Continues to have paresthesia down LE with flexion based movements. OBJECTIVE  Therapeutic Procedures:  Min Procedure Specifics + Rationale   (40) [x] Therapeutic Exercise    See Flowsheet   Rationale: increase ROM and increase strength to improve the patients ability to participate in ADL's    10 [x]  Manual Therapy          STM Left Piriformis with Hip IR/ER PROM  Static Cupping Lumbar Paraspinals, Left HS Bulk and Gastroc/Soleus c Ankle Pumps in Prone    Rationale: decrease pain, increase ROM, increase tissue extensibility, decrease trigger points and increase postural awareness to attain functional use and participation with ADL's        min Patient Education:  YES Reviewed HEP         Other Objective/Functional Measures:    See flowsheet for more details    min VC and demos required throughout session for proper form and increased safety    Progressed therex per flowsheet     Post Treatment Pain Level (on 0 to 10) scale:   0  / 10     ASSESSMENT    Assessment/Changes in Function:     Patient tolerated treatment session well and is progressing well towards goals.  Minimal/no effect to peripheral symptoms at end of session. []  See Progress Note/Recertification   Patient will continue to benefit from skilled PT services to analyze,, cue,, progress,, modify,, demonstrate,, instruct, and address, movement patterns,, therapeutic interventions,, postural abnormalities,, soft tissue restrictions,, ROM,, strength,, functional mobility,, body mechanics/ergonomics, and home and community integration, to attain remaining goals. Progress toward goals / Updated goals:    DC NV if no changes in symptoms.       PLAN    [x]  Upgrade activities as tolerated YES Continue plan of care   []  Discharge due to :    []  Other:      Therapist: Beula Frankel, DPT    Date: 2/25/2020 Time: 7:51 AM     Future Appointments   Date Time Provider Abril Aleman   2/25/2020  8:00 AM [facility]   2/27/2020  8:00 AM [address] [place] [address]   3/9/2020  9:00 AM [facility] MRI RM [facility]

## 2020-02-27 ENCOUNTER — TELEPHONE (OUTPATIENT)
Dept: FAMILY MEDICINE CLINIC | Age: 40
End: 2020-02-27

## 2020-02-27 ENCOUNTER — HOSPITAL ENCOUNTER (OUTPATIENT)
Dept: PHYSICAL THERAPY | Age: 40
Discharge: HOME | End: 2020-02-27
Payer: COMMERCIAL

## 2020-02-27 PROCEDURE — 97110 THERAPEUTIC EXERCISES: CPT

## 2020-02-27 PROCEDURE — 97530 THERAPEUTIC ACTIVITIES: CPT

## 2020-02-27 NOTE — PROGRESS NOTES
[facility] PHYSICAL THERAPY  [address] [place] [address] [address] [place], [address] - Phone: [phone]  Fax: [phone] SUMMARY FOR PHYSICAL THERAPY          Patient Name: Bradley Gonzales : 1980   Treatment/Medical Diagnosis: Low back pain [M54.5]   Referral Source: Esperanza Petersen MD Start of Care [facility]): 2020   Prior Hospitalization: See Medical History Provider #: [id number]   Medications: Verified on Patient Summary List   Visits from [facility]: 9 Missed Visits: 0     GOALS  · Short Term Goals: To be accomplished in  3  weeks:  1. Pt will be compliant with HEP for symptom management at home. MET  2. Pt will be able to centralize symptoms to improve quality of life. NOT MET  · Long Term Goals: To be accomplished in  4-5  weeks:  1. Pt will be independent with HEP at D/C for self management. MET  2. Pt will demonstrate left hip internal rotation arom to 25 degrees to improve functional mechanics. MET  3. Pt will demonstrate left knee extension strength of at least 5/5 to engage in recreational activities. MET  4. Patient will increase FOTO Functional Status score to 62 to decrease functional limitations. NOT MET    SUMMARY OF TREATMENT  Patient's POC has consisted of therex, therapeutic activities, manual therapy prn, modalities prn, pt. education, and a comprehensive HEP. Treatment strategies used to address functional mobility deficits, ROM deficits, strength deficits, analyze and address soft tissue restrictions, analyze and cue movement patterns, analyze and modify body mechanics/ergonomics, assess and modify postural abnormalities and instruct in home and community integration. Key Functional Changes/Progress: Pt reports 85% improved since the start of care and reports being ready for discharge today. He is no longer in constant back and leg pain but still has intermittent pain. Has less pain that affects his sleep and feels more mobile.  He reports minimal improvement in numbness/tingling in foot however. Treatments have consisted of strengthening, flexibility training, cupping, dry needling, esteban method and soft tissue mobilization without long term effects to numbness/tingling. He has improved hip internal rotation and hip extension strength. Left single leg balance performance is significantly better with 10 second improvement and less sway. Still demonstrates increased tightness in quadriceps and hamstrings. ROM / Strength                                                   AROM                      Strength (1-5)      Left Right Left Right   Hip Flexion (0-120)     5 5     Extension (0-20)     5 5     IR (0-45) 29 34 5 5     ER (0-45) WFL WFL 5 5     Abduction (0-45)     5 5   Knee Flexion (0-135)     5 5     Extension (0)     5 5          Deficits:                             Ely's:           [x]? ?? R    [x]? ?? L    [x]? ?? +    []? ?? -                                               Hamstrings 90/90:  []??? R    [x]? ?? L    [x]? ?? +    []? ?? -   Outcome Measures:  EC Single Leg Balance Left: 30\"  Right: 30\"  FOTO Outcome Survey: 54/100    Assessments/Recommendations: Discontinue therapy due to lack of appreciable progress towards goals. If you have any questions/comments please contact us directly at [phone]. Thank you for allowing us to assist in the care of your patient. Therapist Signature:  Stevo Archuleta DPT Date: 2/27/2020   Reporting Period: NA Time: 3:45 AM      Certification Period: NA       NOTE TO PHYSICIAN:  PLEASE COMPLETE THE ORDERS BELOW AND FAX TO   [facility] Physical Therapy: [phone]. If you are unable to process this request in 24 hours please contact our office: [phone].    ___ I have read the above report and request that my patient be discharged from therapy.      Physician Signature:        Date:       Time:

## 2020-02-27 NOTE — PROGRESS NOTES
PHYSICAL THERAPY - DAILY TREATMENT NOTE    Patient Name: Alberto Benjamin        Date: 2020  : 1980   YES Patient  Verified  Visit #:   9   of   10  Insurance: Payor: Karis Ferro / Plan: [employer] HMO / Product Type: HMO /    In time: 153 Out time: 460   Total Treatment Time: 36     Medicare/[employer] Time Tracking (below)   Total Timed Codes (min):  NA 1:1 Treatment Time:  NA     TREATMENT AREA =  Low back pain [M54.5]  SUBJECTIVE    Pain Level (on 0 to 10 scale):  1  / 10   Medication Changes/New allergies or changes in medical history, any new surgeries or procedures? NO    If yes, update Summary List   Subjective Functional Status/Changes:  []  No changes reported     Pt reports 85% improved since the start of care and reports being ready for discharge today. He is no longer in constant back and leg pain but still has some occasionally. Has less pain that affects his sleep and feels more mobile. He reports minimal improvement in numbness/tingling in foot. OBJECTIVE  Therapeutic Procedures:  Min Procedure Specifics + Rationale   28 [x] Therapeutic Exercise    See Flowsheet   Rationale: increase ROM and increase strength to improve the patients ability to participate in ADL's      8 [x] Therapeutic Activity See Flow Sheet    Foto and Reassessment        min Patient Education:  YES Reviewed HEP         Other Objective/Functional Measures:    See flowsheet for more details    no VC and demos required throughout session for proper form and increased safety    ROM / Strength                                                   AROM                      Strength (1-5)      Left Right Left Right   Hip Flexion (0-120)     5 5     Extension (0-20)     5 5     IR (0-45) 29 34 5 5     ER (0-45) WFL WFL 5 5     Abduction (0-45)     5 5   Knee Flexion (0-135)     5 5     Extension (0)     5 5         Deficits:                             Ely's:           [x]? ? R    [x]? ? L    [x]? ? +    []? ? -                                             EBCRCEVVLY 90/90:  []?? R    [x]? ? L    [x]? ? +    []? ? -   Outcome Measures:  EC Single Leg Balance Left: 30\"  Right: 30\"     Progressed therex per flowsheet     Post Treatment Pain Level (on 0 to 10) scale:   0  / 10     ASSESSMENT    Assessment/Changes in Function:     See DC     []  See Progress Note/Recertification   Patient will continue to benefit from skilled PT services to analyze,, cue,, progress,, modify,, demonstrate,, instruct, and address, movement patterns,, therapeutic interventions,, postural abnormalities,, soft tissue restrictions,, ROM,, strength,, functional mobility,, body mechanics/ergonomics, and home and community integration, to attain remaining goals.    Progress toward goals / Updated goals:    See DC     PLAN    []  Upgrade activities as tolerated NO Continue plan of care   []  Discharge due to :    []  Other:      Therapist: Allegra Del Real DPT    Date: 2/27/2020 Time: 7:52 AM     Future Appointments   Date Time Provider Abril Aleman   2/27/2020  8:00 AM [address] [place] [address]   3/9/2020  9:00 AM [facility] MRI RM 1 [facility]

## 2020-02-27 NOTE — TELEPHONE ENCOUNTER
Pt called in and was wanting the orders for his MRI to be sent to the [facility] regional imaging center located at  [address], [address] [place] [address]. Please advise.

## 2020-03-11 ENCOUNTER — TELEPHONE (OUTPATIENT)
Dept: FAMILY MEDICINE CLINIC | Age: 40
End: 2020-03-11

## 2020-03-11 DIAGNOSIS — M54.16 LUMBAR RADICULOPATHY: Primary | ICD-10-CM

## 2020-03-11 NOTE — TELEPHONE ENCOUNTER
Pt. Is checking to see if MRI results was received from [facility]. He stated they faxed it on 03/10/2020 morning.

## 2020-03-12 NOTE — TELEPHONE ENCOUNTER
Reviewed MRI results with patient which showed disc bulge with superimpose left paracentral disc extrusion resulting in mass effect on the left descending nerves. Patient continues to complain of left foot numbness and some weakness. Referral to spine surgery entered. Patient agreed with plan.

## 2020-03-16 DIAGNOSIS — M54.16 LUMBAR RADICULOPATHY: Primary | ICD-10-CM

## 2020-03-31 ENCOUNTER — TELEPHONE (OUTPATIENT)
Dept: FAMILY MEDICINE CLINIC | Age: 40
End: 2020-03-31

## 2020-03-31 NOTE — TELEPHONE ENCOUNTER
[facility] neurosurgery Tabby San is requesting latest office notes. Fax# [phone].  Please assist.

## 2020-09-08 ENCOUNTER — TELEPHONE (OUTPATIENT)
Dept: FAMILY MEDICINE CLINIC | Age: 40
End: 2020-09-08

## 2020-09-08 NOTE — TELEPHONE ENCOUNTER
Patient state that he was exposed to covid on 9/2/2020. Pt was given information where he could get tested and stated. Pt will call back for the result and update his provider as advised.

## 2021-02-19 ENCOUNTER — OFFICE VISIT (OUTPATIENT)
Dept: FAMILY MEDICINE CLINIC | Age: 41
End: 2021-02-19
Payer: COMMERCIAL

## 2021-02-19 VITALS
OXYGEN SATURATION: 98 % | BODY MASS INDEX: 26.44 KG/M2 | DIASTOLIC BLOOD PRESSURE: 80 MMHG | HEART RATE: 82 BPM | TEMPERATURE: 97.8 F | SYSTOLIC BLOOD PRESSURE: 127 MMHG | WEIGHT: 206 LBS | HEIGHT: 74 IN | RESPIRATION RATE: 15 BRPM

## 2021-02-19 DIAGNOSIS — Z01.818 PRE-OP EXAMINATION: Primary | ICD-10-CM

## 2021-02-19 DIAGNOSIS — M54.16 LUMBAR RADICULOPATHY: ICD-10-CM

## 2021-02-19 DIAGNOSIS — R73.03 PREDIABETES: ICD-10-CM

## 2021-02-19 PROBLEM — M51.26 DISPLACEMENT OF LUMBAR INTERVERTEBRAL DISC WITHOUT MYELOPATHY: Status: ACTIVE | Noted: 2020-08-25

## 2021-02-19 PROCEDURE — 99214 OFFICE O/P EST MOD 30 MIN: CPT | Performed by: INTERNAL MEDICINE

## 2021-02-19 RX ORDER — MELOXICAM 15 MG/1
TABLET ORAL
COMMUNITY
Start: 2021-01-14

## 2021-02-19 RX ORDER — GABAPENTIN 300 MG/1
CAPSULE ORAL
COMMUNITY
Start: 2021-01-12

## 2021-02-19 NOTE — PROGRESS NOTES
Preoperative Evaluation    Date of Exam: 02/19/21      Misa Canela  Is a 42 y.o.  male  who presents for preoperative evaluation and management of     Chief Complaint   Patient presents with    Pre-op Exam       History of Present Illness:  Patient has chronic back and left leg pain. Previous therapies: physical therapy with no improvement of symptoms. Procedure/Surgery: Lumbar laminectomy with discectomy  Date of Procedure/Surgery:  03/12/2021    Surgeon:  Fabrizio Simmons Letters: [facility]  Primary Physician: Antionette Gaston MD  Latex Allergy: none    Exercise capacity: Patient does not exercise. Anesthesia Complications: None  History of abnormal bleeding : None  History of Blood Transfusions: yes  Health Care Directive or Living Will: no    Recent use of: NSAIDs    Tetanus up to date: last tetanus booster within 10 years    Past Medical History  Past Medical History:   Diagnosis Date    Hypocholesterolemia     WPW (Scott-Parkinson-White syndrome)         Surgical History  Past Surgical History:   Procedure Laterality Date    HX ENDOSCOPY      HX HEART VALVE SURGERY      HX LUMBAR DISKECTOMY      HX WISDOM TEETH EXTRACTION          Current Medications  Current Outpatient Medications   Medication Sig Dispense    gabapentin (NEURONTIN) 300 mg capsule      meloxicam (MOBIC) 15 mg tablet      multivitamin (ONE A DAY) tablet Take 1 Tab by mouth daily. No current facility-administered medications for this visit. Allergies/Drug Reactions  No Known Allergies     Family History  Family History   Problem Relation Age of Onset    No Known Problems Mother     Heart Disease Father     Hypertension Father     No Known Problems Sister     No Known Problems Brother         Social History  Social History     Tobacco Use    Smoking status: Never Smoker    Smokeless tobacco: Never Used   Substance Use Topics    Alcohol use:  Yes Comment: occasional    Drug use: No        Review of Systems  ROS     Physical Exam:  Vitals:    02/19/21 0831   BP: 127/80   Pulse: 82   Resp: 15   Temp: 97.8 °F (36.6 °C)   TempSrc: Temporal   SpO2: 98%   Weight: 206 lb (93.4 kg)   Height: 6' 2\" (1.88 m)       General: alert, oriented, not in distress  Head: scalp normal, atraumatic  Eyes: pupils are equal and reactive, full and intact EOM's  Ears: patent ear canal, intact tympanic membrane  Nose: normal turbinates, no congestion or discharge  Lips/Mouth: moist lips and buccal mucosa, non-enlarged tonsils, pink throat  Neck: supple, no JVD, no lymphadenopathy, non-palpable thyroid  Chest/Lungs: clear breath sounds, no wheezing or crackles  Heart: normal rate, regular rhythm, no murmur  Abdomen: soft, non-distended, non-tender, normal bowel sounds, no organomegaly, no masses  Extremities: no focal deformities, no edema  Skin: no active skin lesions    Diagnostics:   MR LUMBAR SPINE W/O CONTRAST 12/10/2020  [facility]  Result Impression   1. L5-S1: Large left paracentral disc extrusion with compression of the left-sided thecal sac, left descending sacral nerve roots. Bilateral foraminal stenosis with suspected compression bilateral L5 nerve roots. Grossly unchanged. 2. Central and left paracentral disc protrusion L4-L5, with moderate central stenosis. Impression:    ICD-10-CM ICD-9-CM    1. Pre-op examination  Z01.818 V72.84 CBC WITH AUTOMATED DIFF      METABOLIC PANEL, BASIC      URINALYSIS W/ RFLX MICROSCOPIC      URINALYSIS W/ RFLX MICROSCOPIC      METABOLIC PANEL, BASIC      CBC WITH AUTOMATED DIFF   2. Lumbar radiculopathy  M54.16 724.4    3. Prediabetes  R73.03 790.29 HEMOGLOBIN A1C WITH EAG      HEMOGLOBIN A1C WITH EAG       No medical contraindication for planned surgery  Patient is low risk for procedure. Advised patient to discontinue NSAID at least one week prior to surgery.       Madison Espino MD  02/19/21

## 2021-02-20 LAB
ABSOLUTE LYMPHOCYTE COUNT, 10803: 2.4 K/UL (ref 1–4.8)
ANION GAP SERPL CALC-SCNC: 8 MMOL/L (ref 3–15)
AVG GLU, 10930: 109 MG/DL (ref 91–123)
BACTERIA,BACTU: PRESENT
BASOPHILS # BLD: 0.1 K/UL (ref 0–0.2)
BASOPHILS NFR BLD: 1 % (ref 0–2)
BILIRUB UR QL: NEGATIVE
BUN SERPL-MCNC: 20 MG/DL (ref 6–22)
CA OXALATE CRYSTALS,CAOXC: PRESENT
CALCIUM SERPL-MCNC: 9.7 MG/DL (ref 8.4–10.5)
CHLORIDE SERPL-SCNC: 104 MMOL/L (ref 98–110)
CLARITY: CLEAR
CO2 SERPL-SCNC: 29 MMOL/L (ref 20–32)
COLOR UR: YELLOW
CREAT SERPL-MCNC: 0.8 MG/DL (ref 0.5–1.2)
EOSINOPHIL # BLD: 0.9 K/UL (ref 0–0.5)
EOSINOPHIL NFR BLD: 12 % (ref 0–6)
ERYTHROCYTE [DISTWIDTH] IN BLOOD BY AUTOMATED COUNT: 12.6 % (ref 10–15.5)
GFRAA, 66117: >60
GFRNA, 66118: >60
GLUCOSE SERPL-MCNC: 82 MG/DL (ref 70–99)
GLUCOSE UR QL: NEGATIVE MG/DL
GRANULOCYTES,GRANS: 49 % (ref 40–75)
HBA1C MFR BLD HPLC: 5.4 % (ref 4.8–5.6)
HCT VFR BLD AUTO: 43.4 % (ref 36.6–51.9)
HGB BLD-MCNC: 13.3 G/DL (ref 13.2–17.3)
HGB UR QL STRIP: NEGATIVE
KETONES UR QL STRIP.AUTO: NEGATIVE MG/DL
LEUKOCYTE ESTERASE: NEGATIVE
LYMPHOCYTES, LYMLT: 31 % (ref 20–45)
MCH RBC QN AUTO: 29 PG (ref 26–34)
MCHC RBC AUTO-ENTMCNC: 31 G/DL (ref 31–36)
MCV RBC AUTO: 94 FL (ref 80–95)
MONOCYTES # BLD: 0.6 K/UL (ref 0.1–1)
MONOCYTES NFR BLD: 8 % (ref 3–12)
NEUTROPHILS # BLD AUTO: 3.8 K/UL (ref 1.8–7.7)
NITRITE UR QL STRIP.AUTO: NEGATIVE
PH UR STRIP: 6 PH (ref 5–8)
PLATELET # BLD AUTO: 281 K/UL (ref 140–440)
PMV BLD AUTO: 10 FL (ref 9–13)
POTASSIUM SERPL-SCNC: 4 MMOL/L (ref 3.5–5.5)
PROT UR QL STRIP: NEGATIVE MG/DL
RBC # BLD AUTO: 4.6 M/UL (ref 3.8–5.8)
RBC #/AREA URNS HPF: ABNORMAL /HPF
SODIUM SERPL-SCNC: 141 MMOL/L (ref 133–145)
SOURCE OF URINE, 17028: ABNORMAL
SP GR UR: 1.02 (ref 1–1.03)
URINE ASCORBIC ACID: NEGATIVE MG/DL
UROBILINOGEN UR STRIP-MCNC: <2 MG/DL
WBC # BLD AUTO: 7.9 K/UL (ref 4–11)
WBC URNS QL MICRO: ABNORMAL /HPF (ref 0–2)

## 2021-08-03 NOTE — PROGRESS NOTES
Room #    Chief Complaint:  Immunizations    HPI:    Cha Santos is a 35 y.o. male who presents today for c/o     1. Have you been to the ER, urgent care clinic since your last visit? Hospitalized since your last visit? NO    2. Have you seen or consulted any other health care providers outside of the [address] [place] [address] since your last visit? Include any pap smears or colon screening. NO  When :  Reason:    Health Maintenance reviewed Yes    Health Maintenance Due   Topic Date Due    Influenza Age 5 to Adult  08/01/2019                Cha Santos is a 35 y.o. male who presents for routine immunizations. He denies any symptoms , reactions or allergies that would exclude them from being immunized today. Risks and adverse reactions were discussed and the VIS was given to them. All questions were addressed. He was observed for 15 min post injection. There were no reactions observed.     Rigoberto Pool LPN Hydroxyzine Counseling: Patient advised that the medication is sedating and not to drive a car after taking this medication.  Patient informed of potential adverse effects including but not limited to dry mouth, urinary retention, and blurry vision.  The patient verbalized understanding of the proper use and possible adverse effects of hydroxyzine.  All of the patient's questions and concerns were addressed.

## (undated) DEVICE — KENDALL 500 SERIES DIAPHORETIC FOAM MONITORING ELECTRODE - TEAR DROP SHAPE ( 30/PK): Brand: KENDALL

## (undated) DEVICE — KIT COLON W/ 1.1OZ LUB AND 2 END

## (undated) DEVICE — MEDI-VAC NON-CONDUCTIVE SUCTION TUBING: Brand: CARDINAL HEALTH

## (undated) DEVICE — FLEX ADVANTAGE 1500CC: Brand: FLEX ADVANTAGE

## (undated) DEVICE — SYR 50ML SLIP TIP NSAF LF STRL --

## (undated) DEVICE — BITE BLK ENDOSCP AD 54FR GRN POLYETH ENDOSCP W STRP SLD

## (undated) DEVICE — BASIN EMESIS 500CC ROSE 250/CS 60/PLT: Brand: MEDEGEN MEDICAL PRODUCTS, LLC

## (undated) DEVICE — CONTAINER PREFIL FRMLN 15ML --

## (undated) DEVICE — FORCEPS BX L240CM JAW DIA2.8MM L CAP W/ NDL MIC MESH TOOTH